# Patient Record
Sex: FEMALE | Race: ASIAN | NOT HISPANIC OR LATINO | URBAN - METROPOLITAN AREA
[De-identification: names, ages, dates, MRNs, and addresses within clinical notes are randomized per-mention and may not be internally consistent; named-entity substitution may affect disease eponyms.]

---

## 2018-02-19 ENCOUNTER — INPATIENT (INPATIENT)
Facility: HOSPITAL | Age: 63
LOS: 4 days | Discharge: HOME | End: 2018-02-24
Attending: NEUROLOGICAL SURGERY | Admitting: INTERNAL MEDICINE
Payer: MEDICAID

## 2018-02-19 VITALS
SYSTOLIC BLOOD PRESSURE: 132 MMHG | DIASTOLIC BLOOD PRESSURE: 74 MMHG | OXYGEN SATURATION: 100 % | HEART RATE: 75 BPM | TEMPERATURE: 98 F | RESPIRATION RATE: 16 BRPM

## 2018-02-19 DIAGNOSIS — G91.9 HYDROCEPHALUS, UNSPECIFIED: ICD-10-CM

## 2018-02-19 DIAGNOSIS — R26.2 DIFFICULTY IN WALKING, NOT ELSEWHERE CLASSIFIED: ICD-10-CM

## 2018-02-19 DIAGNOSIS — Z29.9 ENCOUNTER FOR PROPHYLACTIC MEASURES, UNSPECIFIED: ICD-10-CM

## 2018-02-19 DIAGNOSIS — R74.0 NONSPECIFIC ELEVATION OF LEVELS OF TRANSAMINASE AND LACTIC ACID DEHYDROGENASE [LDH]: ICD-10-CM

## 2018-02-19 DIAGNOSIS — R10.13 EPIGASTRIC PAIN: ICD-10-CM

## 2018-02-19 LAB
ALBUMIN SERPL ELPH-MCNC: 4.2 G/DL — SIGNIFICANT CHANGE UP (ref 3.3–5)
ALP SERPL-CCNC: 78 U/L — SIGNIFICANT CHANGE UP (ref 40–120)
ALT FLD-CCNC: 35 U/L — HIGH (ref 4–33)
APPEARANCE UR: CLEAR — SIGNIFICANT CHANGE UP
APTT BLD: 35.4 SEC — SIGNIFICANT CHANGE UP (ref 27.5–37.4)
AST SERPL-CCNC: 35 U/L — HIGH (ref 4–32)
BASOPHILS # BLD AUTO: 0.02 K/UL — SIGNIFICANT CHANGE UP (ref 0–0.2)
BASOPHILS NFR BLD AUTO: 0.2 % — SIGNIFICANT CHANGE UP (ref 0–2)
BILIRUB SERPL-MCNC: 0.2 MG/DL — SIGNIFICANT CHANGE UP (ref 0.2–1.2)
BILIRUB UR-MCNC: NEGATIVE — SIGNIFICANT CHANGE UP
BLOOD UR QL VISUAL: NEGATIVE — SIGNIFICANT CHANGE UP
BUN SERPL-MCNC: 14 MG/DL — SIGNIFICANT CHANGE UP (ref 7–23)
CALCIUM SERPL-MCNC: 9.3 MG/DL — SIGNIFICANT CHANGE UP (ref 8.4–10.5)
CHLORIDE SERPL-SCNC: 97 MMOL/L — LOW (ref 98–107)
CK MB BLD-MCNC: 1.98 NG/ML — SIGNIFICANT CHANGE UP (ref 1–4.7)
CK MB BLD-MCNC: SIGNIFICANT CHANGE UP (ref 0–2.5)
CK SERPL-CCNC: 117 U/L — SIGNIFICANT CHANGE UP (ref 25–170)
CO2 SERPL-SCNC: 23 MMOL/L — SIGNIFICANT CHANGE UP (ref 22–31)
COLOR SPEC: SIGNIFICANT CHANGE UP
CREAT SERPL-MCNC: 0.69 MG/DL — SIGNIFICANT CHANGE UP (ref 0.5–1.3)
EOSINOPHIL # BLD AUTO: 0.09 K/UL — SIGNIFICANT CHANGE UP (ref 0–0.5)
EOSINOPHIL NFR BLD AUTO: 1.1 % — SIGNIFICANT CHANGE UP (ref 0–6)
GLUCOSE SERPL-MCNC: 117 MG/DL — HIGH (ref 70–99)
GLUCOSE UR-MCNC: NEGATIVE — SIGNIFICANT CHANGE UP
HCT VFR BLD CALC: 41.1 % — SIGNIFICANT CHANGE UP (ref 34.5–45)
HGB BLD-MCNC: 13.3 G/DL — SIGNIFICANT CHANGE UP (ref 11.5–15.5)
IMM GRANULOCYTES # BLD AUTO: 0.01 # — SIGNIFICANT CHANGE UP
IMM GRANULOCYTES NFR BLD AUTO: 0.1 % — SIGNIFICANT CHANGE UP (ref 0–1.5)
INR BLD: 0.97 — SIGNIFICANT CHANGE UP (ref 0.88–1.17)
KETONES UR-MCNC: NEGATIVE — SIGNIFICANT CHANGE UP
LEUKOCYTE ESTERASE UR-ACNC: NEGATIVE — SIGNIFICANT CHANGE UP
LIDOCAIN IGE QN: 69.7 U/L — HIGH (ref 7–60)
LYMPHOCYTES # BLD AUTO: 2.83 K/UL — SIGNIFICANT CHANGE UP (ref 1–3.3)
LYMPHOCYTES # BLD AUTO: 34.4 % — SIGNIFICANT CHANGE UP (ref 13–44)
MCHC RBC-ENTMCNC: 28.4 PG — SIGNIFICANT CHANGE UP (ref 27–34)
MCHC RBC-ENTMCNC: 32.4 % — SIGNIFICANT CHANGE UP (ref 32–36)
MCV RBC AUTO: 87.8 FL — SIGNIFICANT CHANGE UP (ref 80–100)
MONOCYTES # BLD AUTO: 0.52 K/UL — SIGNIFICANT CHANGE UP (ref 0–0.9)
MONOCYTES NFR BLD AUTO: 6.3 % — SIGNIFICANT CHANGE UP (ref 2–14)
MUCOUS THREADS # UR AUTO: SIGNIFICANT CHANGE UP
NEUTROPHILS # BLD AUTO: 4.76 K/UL — SIGNIFICANT CHANGE UP (ref 1.8–7.4)
NEUTROPHILS NFR BLD AUTO: 57.9 % — SIGNIFICANT CHANGE UP (ref 43–77)
NITRITE UR-MCNC: NEGATIVE — SIGNIFICANT CHANGE UP
NRBC # FLD: 0 — SIGNIFICANT CHANGE UP
PH UR: 6.5 — SIGNIFICANT CHANGE UP (ref 4.6–8)
PLATELET # BLD AUTO: 210 K/UL — SIGNIFICANT CHANGE UP (ref 150–400)
PMV BLD: 11.6 FL — SIGNIFICANT CHANGE UP (ref 7–13)
POTASSIUM SERPL-MCNC: 4.6 MMOL/L — SIGNIFICANT CHANGE UP (ref 3.5–5.3)
POTASSIUM SERPL-SCNC: 4.6 MMOL/L — SIGNIFICANT CHANGE UP (ref 3.5–5.3)
PROT SERPL-MCNC: 7.4 G/DL — SIGNIFICANT CHANGE UP (ref 6–8.3)
PROT UR-MCNC: NEGATIVE MG/DL — SIGNIFICANT CHANGE UP
PROTHROM AB SERPL-ACNC: 11.1 SEC — SIGNIFICANT CHANGE UP (ref 9.8–13.1)
RBC # BLD: 4.68 M/UL — SIGNIFICANT CHANGE UP (ref 3.8–5.2)
RBC # FLD: 12.5 % — SIGNIFICANT CHANGE UP (ref 10.3–14.5)
RBC CASTS # UR COMP ASSIST: SIGNIFICANT CHANGE UP (ref 0–?)
SODIUM SERPL-SCNC: 135 MMOL/L — SIGNIFICANT CHANGE UP (ref 135–145)
SP GR SPEC: 1.01 — SIGNIFICANT CHANGE UP (ref 1–1.04)
SQUAMOUS # UR AUTO: SIGNIFICANT CHANGE UP
TROPONIN T SERPL-MCNC: < 0.06 NG/ML — SIGNIFICANT CHANGE UP (ref 0–0.06)
UROBILINOGEN FLD QL: NORMAL MG/DL — SIGNIFICANT CHANGE UP
WBC # BLD: 8.23 K/UL — SIGNIFICANT CHANGE UP (ref 3.8–10.5)
WBC # FLD AUTO: 8.23 K/UL — SIGNIFICANT CHANGE UP (ref 3.8–10.5)

## 2018-02-19 PROCEDURE — 70450 CT HEAD/BRAIN W/O DYE: CPT | Mod: 26

## 2018-02-19 PROCEDURE — 99233 SBSQ HOSP IP/OBS HIGH 50: CPT | Mod: GC

## 2018-02-19 PROCEDURE — 70553 MRI BRAIN STEM W/O & W/DYE: CPT | Mod: 26

## 2018-02-19 PROCEDURE — 71046 X-RAY EXAM CHEST 2 VIEWS: CPT | Mod: 26

## 2018-02-19 RX ORDER — ENOXAPARIN SODIUM 100 MG/ML
40 INJECTION SUBCUTANEOUS EVERY 24 HOURS
Qty: 0 | Refills: 0 | Status: DISCONTINUED | OUTPATIENT
Start: 2018-02-19 | End: 2018-02-19

## 2018-02-19 RX ORDER — LIDOCAINE 4 G/100G
10 CREAM TOPICAL ONCE
Qty: 0 | Refills: 0 | Status: COMPLETED | OUTPATIENT
Start: 2018-02-19 | End: 2018-02-19

## 2018-02-19 RX ORDER — SODIUM CHLORIDE 9 MG/ML
1000 INJECTION INTRAMUSCULAR; INTRAVENOUS; SUBCUTANEOUS ONCE
Qty: 0 | Refills: 0 | Status: COMPLETED | OUTPATIENT
Start: 2018-02-19 | End: 2018-02-19

## 2018-02-19 RX ORDER — FAMOTIDINE 10 MG/ML
20 INJECTION INTRAVENOUS ONCE
Qty: 0 | Refills: 0 | Status: COMPLETED | OUTPATIENT
Start: 2018-02-19 | End: 2018-02-19

## 2018-02-19 RX ADMIN — FAMOTIDINE 20 MILLIGRAM(S): 10 INJECTION INTRAVENOUS at 08:31

## 2018-02-19 RX ADMIN — LIDOCAINE 10 MILLILITER(S): 4 CREAM TOPICAL at 08:31

## 2018-02-19 RX ADMIN — SODIUM CHLORIDE 1000 MILLILITER(S): 9 INJECTION INTRAMUSCULAR; INTRAVENOUS; SUBCUTANEOUS at 08:31

## 2018-02-19 RX ADMIN — Medication 30 MILLILITER(S): at 08:31

## 2018-02-19 NOTE — ED ADULT TRIAGE NOTE - CHIEF COMPLAINT QUOTE
Pt minimal English Yolanda speaking. pt st" I have pain here." localizing pain to epigastric area. As per Niece " She has this pain on & off for two weeks now and is walking very slow takes baby steps...she is very weak. She was just dx with fluid on kidney and needs to get a cat scan." as per Interpretation "There is a pain near stomach and heart , I feel very weak can't seem to walk just taking child like steps....sometimes I feel short of breath. " Pt minimal English Yolanda speaking. pt st" I have pain here." localizing pain to epigastric area. As per Niece " She has this pain on & off for two weeks now she is walking very slow takes baby steps...she is very weak. She was just dx with fluid on kidney and needs to get a cat scan."   As per [place] Interpretation  Pt stating "There is a pain near heart and stomach..., I feel very weak can't seem to walk just taking child like steps....sometimes I feel short of breath. "

## 2018-02-19 NOTE — ED PROVIDER NOTE - MEDICAL DECISION MAKING DETAILS
63 y/o F presents with c/o weakness, n/t with walking, epigastric pain non radiating, not relieved or worsen with food intake or movement. r/o ACS vs GERD, cbc, cmp, froylan, ekg, cxr, pepcid, maalox, lidocaine viscous, iv ns, reassess.  possible dc with neurology f/u

## 2018-02-19 NOTE — CONSULT NOTE ADULT - ASSESSMENT
62F p/w gait instability, memory loss found to have hydrocephalus w/ transependymal flow. no HAs, neurologically intact w/ wide-based gait.    - MRI non contrast CSF flow w/ CISS sequence  - neurology f/u 62F p/w gait instability, memory loss found to have hydrocephalus w/ transependymal flow. no HAs, neurologically intact w/ wide-based gait.    - MRI non contrast CSF flow w/ CISS sequence  - MRI non contrast C/T/L-spine  - neurology f/u

## 2018-02-19 NOTE — H&P ADULT - NSHPSOCIALHISTORY_GEN_ALL_CORE
no tobacco, alcohol or drug use.  w/ children. born in [facility] now lives in [place]

## 2018-02-19 NOTE — ED ADULT NURSE NOTE - OBJECTIVE STATEMENT
Cally RN : Pt. A&Ox4 , primarily [place] speaking translated by christ by bedside c/o epigastric pain since earlier this morning . Pt. denies any N/V , abd pain. Pt. states for the past 3 x weeks she has noticed increase weakness and numbness on hands and feet.  pt. [id number] weeks she was told she has a cyst on her kidney , was not able to give further info. Pt. Vitals as noted, and in no acute distress. Pt. placed on cardiac monitor , NS. IV placed on right hand labs drawn and sent . Rpt given to primary RN .

## 2018-02-19 NOTE — H&P ADULT - NSHPREVIEWOFSYSTEMS_GEN_ALL_CORE
CONSTITUTIONAL: No fever, no chills  EYES: No eye pain, no visual disturbance  Mouth: no pain in mouth, no cuts  RESPIRATORY: No cough, No sob  CARDIOVASCULAR: No CP, no palpitations  GASTROINTESTINAL: epigastric pain+, no n/v/d  GENITOURINARY: No dysuria, no hematuria  NEUROLOGICAL: No headaches, difficulty walking+  SKIN: No itching, no rashes  MUSCULOSKELETAL: No joint pain, no joint swelling

## 2018-02-19 NOTE — H&P ADULT - NSHPPHYSICALEXAM_GEN_ALL_CORE
Vital Signs Last 24 Hrs  T(C): 36.4 (19 Feb 2018 07:12), Max: 36.4 (19 Feb 2018 07:12)  T(F): 97.5 (19 Feb 2018 07:12), Max: 97.5 (19 Feb 2018 07:12)  HR: 72 (19 Feb 2018 08:53) (72 - 78)  BP: 133/88 (19 Feb 2018 08:53) (132/74 - 146/91)  BP(mean): --  RR: 15 (19 Feb 2018 08:53) (15 - 16)  SpO2: 100% (19 Feb 2018 08:53) (98% - 100%)    GENERAL: NAD, well-developed  HEAD:  Atraumatic, Normocephalic  EYES: EOMI, PERRLA, conjunctiva and sclera clear  Mouth: MMM, no lesions  NECK: Supple, no appreciable masses  Lung: normal work of breathing, cta b/l  Chest: S1&S2+, rrr, no m/r/g appreciated  ABDOMEN: bs+, soft, mild epigastric tenderness, nd, no appreciable masses  : No peterson catheter, no CVA tenderness  EXTREMITIES:  radial pulse present b/l, PT present b/l, no pitting edema present  Neuro: A&Ox3 (took extra time to give month and year), CN II-XII intact, motor in upper and lower extremities in flexor and extensors 5/5, finger to nose b/l intact, romberg positive, no pronator drift, broad based-shuffling when walking, truncal ataxia when walking tandem walking  SKIN: warm and dry, no visible rashes

## 2018-02-19 NOTE — H&P ADULT - PROBLEM SELECTOR PLAN 1
Patient w/ difficulty walking x3d w/ obstructive hydrocephalus on CT  - Neurology consult appreciated. MRI Brain ordered  - Per ED neurosurgery has been consulted  - fall precautions

## 2018-02-19 NOTE — H&P ADULT - PROBLEM SELECTOR PLAN 4
SCD for dvt ppx given possible need for LP repeat LFT's. if elevated will need to check Hep panel and RUQ US

## 2018-02-19 NOTE — H&P ADULT - ATTENDING COMMENTS
Patient seen and examined, plan of care d/w resident.    63 yo F with h/o EDA infection presenting w/ gait instability and memory issues CTH here with new hydrocephalus when compared to 2010, seen by neurology rec MRI to r/o obstructive lesion.    #Hydrocephalus:  NPH vs obstructive vs other  -f/u MRI  -f/u neurology---defer to them whether pt needs further IP w/u if MRI negative  -NSY c/s per neuro  -fall precautions  -PT eval

## 2018-02-19 NOTE — H&P ADULT - NSHPLABSRESULTS_GEN_ALL_CORE
Personally reviewed available labs, imaging and ekg     Labs  CBC Full  -  ( 2018 07:50 )  WBC Count : 8.23 K/uL  Hemoglobin : 13.3 g/dL  Hematocrit : 41.1 %  Platelet Count - Automated : 210 K/uL  Mean Cell Volume : 87.8 fL  Mean Cell Hemoglobin : 28.4 pg  Mean Cell Hemoglobin Concentration : 32.4 %  Auto Neutrophil # : 4.76 K/uL  Auto Lymphocyte # : 2.83 K/uL  Auto Monocyte # : 0.52 K/uL  Auto Eosinophil # : 0.09 K/uL  Auto Basophil # : 0.02 K/uL  Auto Neutrophil % : 57.9 %  Auto Lymphocyte % : 34.4 %  Auto Monocyte % : 6.3 %  Auto Eosinophil % : 1.1 %  Auto Basophil % : 0.2 %        135  |  97<L>  |  14  ----------------------------<  117<H>  4.6   |  23  |  0.69    Ca    9.3      2018 07:50    TPro  7.4  /  Alb  4.2  /  TBili  0.2  /  DBili  x   /  AST  35<H>  /  ALT  35<H>  /  AlkPhos  78      PT/INR - ( 2018 07:50 )   PT: 11.1 SEC;   INR: 0.97          PTT - ( 2018 07:50 )  PTT:35.4 SEC  Urinalysis Basic - ( 2018 09:41 )    Color: COLORL / Appearance: CLEAR / S.010 / pH: 6.5  Gluc: NEGATIVE / Ketone: NEGATIVE  / Bili: NEGATIVE / Urobili: NORMAL mg/dL   Blood: NEGATIVE / Protein: NEGATIVE mg/dL / Nitrite: NEGATIVE   Leuk Esterase: NEGATIVE / RBC: 0-2 / WBC x   Sq Epi: OCC / Non Sq Epi: x / Bacteria: x      CAPILLARY BLOOD GLUCOSE        CARDIAC MARKERS ( 2018 07:50 )  x     / < 0.06 ng/mL / 117 u/L / 1.98 ng/mL / x        Imaging  < from: CT Head No Cont (18 @ 09:28) >    IMPRESSION:     New hydrocephalus likely at the junction of the aqueduct and the fourth   ventricle, since 2010.    < end of copied text >        EKG: NSR 86bpm w/o SABRA appreciated Personally reviewed available labs, imaging and ekg     Labs  CBC Full  -  ( 2018 07:50 )  WBC Count : 8.23 K/uL  Hemoglobin : 13.3 g/dL  Hematocrit : 41.1 %  Platelet Count - Automated : 210 K/uL  Mean Cell Volume : 87.8 fL  Mean Cell Hemoglobin : 28.4 pg  Mean Cell Hemoglobin Concentration : 32.4 %  Auto Neutrophil # : 4.76 K/uL  Auto Lymphocyte # : 2.83 K/uL  Auto Monocyte # : 0.52 K/uL  Auto Eosinophil # : 0.09 K/uL  Auto Basophil # : 0.02 K/uL  Auto Neutrophil % : 57.9 %  Auto Lymphocyte % : 34.4 %  Auto Monocyte % : 6.3 %  Auto Eosinophil % : 1.1 %  Auto Basophil % : 0.2 %        135  |  97<L>  |  14  ----------------------------<  117<H>  4.6   |  23  |  0.69    Ca    9.3      2018 07:50    TPro  7.4  /  Alb  4.2  /  TBili  0.2  /  DBili  x   /  AST  35<H>  /  ALT  35<H>  /  AlkPhos  78      PT/INR - ( 2018 07:50 )   PT: 11.1 SEC;   INR: 0.97          PTT - ( 2018 07:50 )  PTT:35.4 SEC  Urinalysis Basic - ( 2018 09:41 )    Color: COLORL / Appearance: CLEAR / S.010 / pH: 6.5  Gluc: NEGATIVE / Ketone: NEGATIVE  / Bili: NEGATIVE / Urobili: NORMAL mg/dL   Blood: NEGATIVE / Protein: NEGATIVE mg/dL / Nitrite: NEGATIVE   Leuk Esterase: NEGATIVE / RBC: 0-2 / WBC x   Sq Epi: OCC / Non Sq Epi: x / Bacteria: x      CAPILLARY BLOOD GLUCOSE        CARDIAC MARKERS ( 2018 07:50 )  x     / < 0.06 ng/mL / 117 u/L / 1.98 ng/mL / x        Imaging  < from: CT Head No Cont (18 @ 09:28) >    IMPRESSION:   New hydrocephalus likely at the junction of the aqueduct and the fourth   ventricle, since 2010.  < end of copied text >    CXR AP 18: Clear lungs    EKG: NSR 86bpm w/o SABRA appreciated

## 2018-02-19 NOTE — H&P ADULT - PROBLEM SELECTOR PLAN 2
Acute Hydrocephalus w/ obstruction at the transition from aqueduct to 4th ventricle  - identified on CT head and w/o evidence of intracranial hemorrhage, extraaxial collection, vasogenic edema, mass effect, midline shift, or central herniation.  - pending MRI per Neuro and NeuroSx consult  - fall precautions

## 2018-02-19 NOTE — ED ADULT NURSE REASSESSMENT NOTE - NS ED NURSE REASSESS COMMENT FT1
pt started by night float RN, appears in NAD @ this time, mes as per orders, awaiting reassessment, results, and further orders, family @ bedside, will continue to monitor.

## 2018-02-19 NOTE — CONSULT NOTE ADULT - SUBJECTIVE AND OBJECTIVE BOX
Neurology Consult Note      Pt is a 61 y/o F pt with no sig pmhx presents with difficulty walking for 3 days.  Pt states she has had difficulty walking, generalized weakness and memory problems for 3 - 4 days. She has never had similar complaints in the past and cannot recall any inciting events that could be attributed to current symptoms. No trauma, no back pain, no recent falls. She has a history of uncontrollable bladder which has been going on for months. Also, family at the bedsdie reports a change in mentation noticed when cooking a meal. For the past several days has not put appropriate ingredients as she normally would. Pt also confirms change in mentation and difficulty with short term memory.    Social - returned from a trip to [facility] 3 weeks ago. Denies HA, blurry vision, cp, sob, n/v/d, back pain.    [facility] [phone]    Vital Signs Last 24 Hrs  T(C): 36.4 (2018 07:12), Max: 36.4 (2018 07:12)  T(F): 97.5 (2018 07:12), Max: 97.5 (2018 07:12)  HR: 72 (2018 08:53) (72 - 78)  BP: 133/88 (2018 08:53) (132/74 - 146/91)  BP(mean): --  RR: 15 (2018 08:53) (15 - 16)  SpO2: 100% (2018 08:53) (98% - 100%)    Neuro Exam  MS - AAOx3, difficulty remembering the current month and year, 3 out of 3 word recal in immediate short term, in intermediate interval recalls one words. follows 2 step commands, crosses midline when prompted.   CNS - EOMI, b/l few beat horizontal nystagmus, face symmetric, no sensory changes.  Motor - 5/5 throughout  Sensory - light touch in tact throughout  Coordination - FNF in tact b/l  Gait - can walk unassisted narrow steppage, difficulty tandem walking, rom cage positive.   Reflexes - Hyperflexive throughout - triceps, biceps, brachiorad, patellar, ankles    Labs                        13.3   8.23  )-----------( 210      ( 2018 07:50 )             41.1   02-    135  |  97<L>  |  14  ----------------------------<  117<H>  4.6   |  23  |  0.69    Ca    9.3      2018 07:50    TPro  7.4  /  Alb  4.2  /  TBili  0.2  /  DBili  x   /  AST  35<H>  /  ALT  35<H>  /  AlkPhos  78      PT/INR - ( 2018 07:50 )   PT: 11.1 SEC;   INR: 0.97          PTT - ( 2018 07:50 )  PTT:35.4 SEC    LIVER FUNCTIONS - ( 2018 07:50 )  Alb: 4.2 g/dL / Pro: 7.4 g/dL / ALK PHOS: 78 u/L / ALT: 35 u/L / AST: 35 u/L / GGT: x           Urinalysis Basic - ( 2018 09:41 )    Color: COLORL / Appearance: CLEAR / S.010 / pH: 6.5  Gluc: NEGATIVE / Ketone: NEGATIVE  / Bili: NEGATIVE / Urobili: NORMAL mg/dL   Blood: NEGATIVE / Protein: NEGATIVE mg/dL / Nitrite: NEGATIVE   Leuk Esterase: NEGATIVE / RBC: 0-2 / WBC x   Sq Epi: OCC / Non Sq Epi: x / Bacteria: x    Imaging  CT head - New hydrocephalus likely at the junction of the aqueduct and the fourth   ventricle, since 2010. Neurology Consult Note      Pt is a 61 y/o F pt with no sig pmhx presents with difficulty walking for 3 days.  Pt states she has had difficulty walking, generalized weakness and memory problems for 3 - 4 days. She has never had similar complaints in the past and cannot recall any inciting events that could be attributed to current symptoms. No trauma, no back pain, no recent falls. She has a history of uncontrollable bladder which has been going on for months. Also, family at the bedsdie reports a change in mentation noticed when cooking a meal. For the past several days has not put appropriate ingredients as she normally would. Pt also confirms change in mentation and difficulty with short term memory.    Social - returned from a trip to [facility] 3 weeks ago. Denies HA, blurry vision, cp, sob, n/v/d, back pain.    [facility] [phone]    Vital Signs Last 24 Hrs  T(C): 36.4 (2018 07:12), Max: 36.4 (2018 07:12)  T(F): 97.5 (2018 07:12), Max: 97.5 (2018 07:12)  HR: 72 (2018 08:53) (72 - 78)  BP: 133/88 (2018 08:53) (132/74 - 146/91)  BP(mean): --  RR: 15 (2018 08:53) (15 - 16)  SpO2: 100% (2018 08:53) (98% - 100%)    Neuro Exam  MS - AAOx3, difficulty remembering the current month and year, 3 out of 3 word recal in immediate short term, in intermediate interval recalls one words. follows 2 step commands, crosses midline when prompted.   CNS - EOMI, b/l few beat horizontal nystagmus, face symmetric, no sensory changes.  Motor - 5/5 throughout  Sensory - light touch in tact throughout  Coordination - FNF in tact b/l  Gait - can walk unassisted, wide based gait, difficulty tandem walking, romberg positive.   Reflexes - Hyperflexive throughout - triceps, biceps, brachiorad, patellar, ankles    Labs                        13.3   8.23  )-----------( 210      ( 2018 07:50 )             41.1   02-    135  |  97<L>  |  14  ----------------------------<  117<H>  4.6   |  23  |  0.69    Ca    9.3      2018 07:50    TPro  7.4  /  Alb  4.2  /  TBili  0.2  /  DBili  x   /  AST  35<H>  /  ALT  35<H>  /  AlkPhos  78      PT/INR - ( 2018 07:50 )   PT: 11.1 SEC;   INR: 0.97          PTT - ( 2018 07:50 )  PTT:35.4 SEC    LIVER FUNCTIONS - ( 2018 07:50 )  Alb: 4.2 g/dL / Pro: 7.4 g/dL / ALK PHOS: 78 u/L / ALT: 35 u/L / AST: 35 u/L / GGT: x           Urinalysis Basic - ( 2018 09:41 )    Color: COLORL / Appearance: CLEAR / S.010 / pH: 6.5  Gluc: NEGATIVE / Ketone: NEGATIVE  / Bili: NEGATIVE / Urobili: NORMAL mg/dL   Blood: NEGATIVE / Protein: NEGATIVE mg/dL / Nitrite: NEGATIVE   Leuk Esterase: NEGATIVE / RBC: 0-2 / WBC x   Sq Epi: OCC / Non Sq Epi: x / Bacteria: x    Imaging  CT head - New hydrocephalus likely at the junction of the aqueduct and the fourth   ventricle, since 2010.

## 2018-02-19 NOTE — ED PROVIDER NOTE - ATTENDING CONTRIBUTION TO CARE
Dr. Banegas:  I performed a face to face bedside interview with patient regarding history of present illness, review of symptoms and past medical history. I completed an independent physical exam.  I have discussed patient's plan of care with PA.   I agree with note as stated above, having amended the EMR as needed to reflect my findings.   This includes HISTORY OF PRESENT ILLNESS, HIV, PAST MEDICAL/SURGICAL/FAMILY/SOCIAL HISTORY, ALLERGIES AND HOME MEDICATIONS, REVIEW OF SYSTEMS, PHYSICAL EXAM, and any PROGRESS NOTES during the time I functioned as the attending physician for this patient.    62F denies pmh presents with weakness and numbness/tingling in extremities x 2-3 weeks and intermittent epigastric pain x 3 days.  Came from [place] 3 weeks ago.  +Subjective fevers upon initial arrival but not recently.  Denies HA, cp, sob, n/v/d, back pain, urinary symptoms.      Exam:  - nad  - rrr  - ctab  - abd soft, +discomfort to palpation epigastric area  - no focal neuro deficits, strength 5/5 bilateral extremities, sensation to light touch equal bilateral extremities, normal gait (although pt states she is taking "baby steps" compared to usual)    A/P  - ddx: gerd/gastritis, gallstones, r/o ACS  - cbc, cmp, lipase, vbg, trop, ua  - ekg  - cxr  - US RUQ  - pepcid, maalox Dr. Banegas:  I performed a face to face bedside interview with patient regarding history of present illness, review of symptoms and past medical history. I completed an independent physical exam.  I have discussed patient's plan of care with PA.   I agree with note as stated above, having amended the EMR as needed to reflect my findings.   This includes HISTORY OF PRESENT ILLNESS, HIV, PAST MEDICAL/SURGICAL/FAMILY/SOCIAL HISTORY, ALLERGIES AND HOME MEDICATIONS, REVIEW OF SYSTEMS, PHYSICAL EXAM, and any PROGRESS NOTES during the time I functioned as the attending physician for this patient.    62F denies pmh presents with weakness and numbness/tingling in extremities x 2-3 weeks and intermittent epigastric pain x 3 days.  Over past two weeks, daughter noticed that patient is taking smaller "baby" steps, which is different than her usual gait.  Pt attributes it to feeling weak.  Came from [place] 3 weeks ago.  +Subjective fevers 3 weeks ago but has resolved.  Denies HA, cp, sob, n/v/d, back pain, urinary symptoms.      Exam:  - nad  - rrr  - ctab  - abd soft, +mild discomfort to palpation epigastric area  - no focal neuro deficits, strength 5/5 bilateral extremities, sensation to light touch equal bilateral extremities, negative Romberg, rectal tone intact, slightly shuffling gait but non-ataxic, reflexes intact upper & lower extremities (?mildly hyper-reflexive lower extremity)    A/P  - ddx epigastric pain: gerd/gastritis, gallstones, r/o ACS;   - shuffling gait, unclear etiology, no focal neuro deficits otherwise; consider Guillain-Barré but 5/5 strength in lower extremity on exam, consider early onset Parkinson's; f/u neurology  - cbc, cmp, lipase, vbg, trop, ua  - ekg  - cxr  - pepcid, maalox Dr. Banegas:  I performed a face to face bedside interview with patient regarding history of present illness, review of symptoms and past medical history. I completed an independent physical exam.  I have discussed patient's plan of care with PA.   I agree with note as stated above, having amended the EMR as needed to reflect my findings.   This includes HISTORY OF PRESENT ILLNESS, HIV, PAST MEDICAL/SURGICAL/FAMILY/SOCIAL HISTORY, ALLERGIES AND HOME MEDICATIONS, REVIEW OF SYSTEMS, PHYSICAL EXAM, and any PROGRESS NOTES during the time I functioned as the attending physician for this patient.    62F denies pmh presents with weakness and numbness/tingling in extremities x 2-3 weeks and intermittent epigastric pain x 3 days.  Over past two weeks, daughter noticed that patient is taking smaller "baby" steps, which is different than her usual gait.  Pt attributes it to feeling weak.  Came from [place] 3 weeks ago.  +Subjective fevers 3 weeks ago but has resolved.  Denies HA, cp, sob, n/v/d, back pain.  Also states she seems to be having trouble holding her urine.    Exam:  - nad  - rrr  - ctab  - abd soft, +mild discomfort to palpation epigastric area  - no focal neuro deficits, strength 5/5 bilateral extremities, sensation to light touch equal bilateral extremities, negative Romberg, rectal tone intact, slightly shuffling gait but non-ataxic, reflexes intact upper & lower extremities (?mildly hyper-reflexive lower extremity)    A/P  - ddx epigastric pain: gerd/gastritis, gallstones, r/o ACS;   - shuffling gait, unclear etiology but concern for neuro pathology  - cbc, cmp, lipase, vbg, trop, ua  - ekg  - cxr  - CT head  - pepcid, maalox  - neuro consult

## 2018-02-19 NOTE — CONSULT NOTE ADULT - SUBJECTIVE AND OBJECTIVE BOX
HPI: 62yof w/ no significant PMHx p/w gait instability, several episodes of urinary incontinence, and memory loss since 7 days ago. Her symptoms began suddenly and did not resolve. She presented to the ER today because of the gait instability. CTH shows marked dilation of the B/L ventricles and third ventricle. MRI shows no mass lesion w/ transependymal flow.    PMHx: none  PSHx: none  Medications: aluminum hydroxide/magnesium hydroxide/simethicone Suspension PRN    Allergies: nkda  Social history: none  Family History: none    VS: T(C): 36.6 (02-19-18 @ 16:22)  HR: 101 (02-19-18 @ 16:22)  BP: 123/80 (02-19-18 @ 16:22)  RR: 18 (02-19-18 @ 16:22)  SpO2: 100% (02-19-18 @ 16:22)  Wt(kg): --                          13.3   8.23  )-----------( 210      ( 19 Feb 2018 07:50 )             41.1     02-19    135  |  97<L>  |  14  ----------------------------<  117<H>  4.6   |  23  |  0.69    Ca    9.3      19 Feb 2018 07:50    TPro  7.4  /  Alb  4.2  /  TBili  0.2  /  DBili  x   /  AST  35<H>  /  ALT  35<H>  /  AlkPhos  78  02-19    PT/INR - ( 19 Feb 2018 07:50 )   PT: 11.1 SEC;   INR: 0.97          PTT - ( 19 Feb 2018 07:50 )  PTT:35.4 SEC    Exam:  AAOx3  PERRL, EOMI, face symmetric, no tongue deviation  5/5 throughout, no pronator drift  Sensation intact to light touch throughout  Reflexes 2+ throughout  No dysmetria  slow wide based gait

## 2018-02-19 NOTE — CONSULT NOTE ADULT - ASSESSMENT
Pt is a 63 y/o F pt with no sig pmhx presents with difficulty walking for 3 days.  Pt states she has had difficulty walking, generalized weakness and memory problems for 3 - 4 days. Neuro exam is remarkable for hyperflexia throughout, narrow based shuffling gait, and short term memory deficit. CT head shows new hydrocephalus likely at the junction of the aqueduct and the fourth ventricle.    DDx - Abnormal gait and mentation 2/2 acute hydrocephalus seen on CT head    Reccs:  Either admission vs CDU   Neurosurgery eval  MRI w/wo to assess for any obstruction/mass lesion Pt is a 63 y/o F pt with no sig pmhx presents with difficulty walking for 3 days.  Pt states she has had difficulty walking, generalized weakness and memory problems for 3 - 4 days. Neuro exam is remarkable for hyperflexia throughout, wide based gait, and short term memory deficit. CT head shows new hydrocephalus likely at the junction of the aqueduct and the fourth ventricle.    DDx - Abnormal gait and memory problems 2/2 acute hydrocephalus seen on CT head    Reccs:  Either admission vs CDU   Neurosurgery eval  MRI w/wo to assess for any obstruction/mass lesion

## 2018-02-19 NOTE — ED PROVIDER NOTE - OBJECTIVE STATEMENT
63 y/o F pt with no sig pmhx presents with weakness and numbness/tingling in extremities x 2-3 weeks and intermittent epigastric pain x 3 days.  Pt states she feels weakness, numbness /tingling when she is walking, her epigastric pain does not get relieved or worse with activity or food intake. Pt denies taking any antiacids. Pt came from [place] 3 weeks ago.  +Subjective fevers upon initial arrival but not recently.  Denies HA, blurry vision, cp, sob, n/v/d, back pain, urinary symptoms. 61 y/o F pt with no sig pmhx presents with weakness and numbness/tingling in extremities x 2-3 weeks and intermittent epigastric pain x 3 days.  Pt states she feels weakness, numbness /tingling when she is walking, her epigastric pain does not get relieved or worse with activity or food intake. Pt denies taking any antiacids. Pt came from [place] 3 weeks ago.  +Subjective fevers upon initial arrival but not recently.  Denies HA, blurry vision, cp, sob, n/v/d, back pain, urinary symptoms.  Maximo santacruz's # [phone]

## 2018-02-19 NOTE — H&P ADULT - ASSESSMENT
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

## 2018-02-19 NOTE — H&P ADULT - PROBLEM SELECTOR PLAN 3
patient w/ intermittent epigastric pain for past few days. will clinically monitor, unclear if 2/2 acid reflux. w/o n/v/d. will provide trial of PPI w/ protonix 40mg PO daily

## 2018-02-19 NOTE — ED ADULT NURSE NOTE - CHIEF COMPLAINT QUOTE
Pt minimal English Yolanda speaking. pt st" I have pain here." localizing pain to epigastric area. As per Niece " She has this pain on & off for two weeks now she is walking very slow takes baby steps...she is very weak. She was just dx with fluid on kidney and needs to get a cat scan."   As per [facility] Interpretation  Pt stating "There is a pain near heart and stomach..., I feel very weak can't seem to walk just taking child like steps....sometimes I feel short of breath. "

## 2018-02-19 NOTE — H&P ADULT - HISTORY OF PRESENT ILLNESS
Patient deferred translation and discussed history in English    62F w/ hx of MAC lung infection s/p 6mo antibiotics presents to [facility] w/ complaint of 3d of difficulty walking. Patient reports that over the last 3d she has had to take small steps to walk to maintain balance. She has not had any trauma, fall, or back pain associated w/ inability to ambulate normally. Additionally she reports that her urinary frequency has increased over the last few days w/o painful urination or blood in urine as well has been forgetful over the last few weeks. She has not had any fevers (although reported to ED subjective fever 3wk prior), chills, headache, neck stiffness, chest pain, sob, cough, n/v/d, or new rashes on her body. She did report intermittent mid-abdominal pain hard to characterize. Additionally, she reports traveling to [place] 1mo prior. Patient deferred translation and discussed history in English as she also speak Yolanda    62F w/ hx of MAC lung infection s/p 6mo antibiotics presents to [facility] w/ complaint of 3d of difficulty walking. Patient reports that over the last 3d she has had to take small steps to walk to maintain balance. She has not had any trauma, fall, or back pain associated w/ inability to ambulate normally. Additionally she reports that her urinary frequency has increased over the last few days w/o painful urination or blood in urine as well has been forgetful over the last few weeks. She has not had any fevers (although reported to ED subjective fever 3wk prior), chills, headache, neck stiffness, chest pain, sob, cough, n/v/d, or new rashes on her body. She did report intermittent mid-abdominal pain hard to characterize. Additionally, she reports traveling to [place] 1mo prior.

## 2018-02-20 DIAGNOSIS — R07.9 CHEST PAIN, UNSPECIFIED: ICD-10-CM

## 2018-02-20 LAB
ALBUMIN SERPL ELPH-MCNC: 4.1 G/DL — SIGNIFICANT CHANGE UP (ref 3.3–5)
ALP SERPL-CCNC: 74 U/L — SIGNIFICANT CHANGE UP (ref 40–120)
ALT FLD-CCNC: 30 U/L — SIGNIFICANT CHANGE UP (ref 4–33)
APTT BLD: 35.5 SEC — SIGNIFICANT CHANGE UP (ref 27.5–37.4)
AST SERPL-CCNC: 25 U/L — SIGNIFICANT CHANGE UP (ref 4–32)
BASOPHILS # BLD AUTO: 0.02 K/UL — SIGNIFICANT CHANGE UP (ref 0–0.2)
BASOPHILS NFR BLD AUTO: 0.2 % — SIGNIFICANT CHANGE UP (ref 0–2)
BILIRUB SERPL-MCNC: 0.5 MG/DL — SIGNIFICANT CHANGE UP (ref 0.2–1.2)
BUN SERPL-MCNC: 12 MG/DL — SIGNIFICANT CHANGE UP (ref 7–23)
CALCIUM SERPL-MCNC: 9.2 MG/DL — SIGNIFICANT CHANGE UP (ref 8.4–10.5)
CHLORIDE SERPL-SCNC: 98 MMOL/L — SIGNIFICANT CHANGE UP (ref 98–107)
CO2 SERPL-SCNC: 25 MMOL/L — SIGNIFICANT CHANGE UP (ref 22–31)
CREAT SERPL-MCNC: 0.65 MG/DL — SIGNIFICANT CHANGE UP (ref 0.5–1.3)
EOSINOPHIL # BLD AUTO: 0.09 K/UL — SIGNIFICANT CHANGE UP (ref 0–0.5)
EOSINOPHIL NFR BLD AUTO: 1.1 % — SIGNIFICANT CHANGE UP (ref 0–6)
GLUCOSE SERPL-MCNC: 104 MG/DL — HIGH (ref 70–99)
HCG UR-SCNC: NEGATIVE — SIGNIFICANT CHANGE UP
HCT VFR BLD CALC: 40.3 % — SIGNIFICANT CHANGE UP (ref 34.5–45)
HCV AB S/CO SERPL IA: 0.14 S/CO — SIGNIFICANT CHANGE UP
HCV AB SERPL-IMP: SIGNIFICANT CHANGE UP
HGB BLD-MCNC: 13.5 G/DL — SIGNIFICANT CHANGE UP (ref 11.5–15.5)
HIV 1+2 AB+HIV1 P24 AG SERPL QL IA: SIGNIFICANT CHANGE UP
IMM GRANULOCYTES # BLD AUTO: 0.02 # — SIGNIFICANT CHANGE UP
IMM GRANULOCYTES NFR BLD AUTO: 0.2 % — SIGNIFICANT CHANGE UP (ref 0–1.5)
INR BLD: 1.05 — SIGNIFICANT CHANGE UP (ref 0.88–1.17)
LYMPHOCYTES # BLD AUTO: 3.41 K/UL — HIGH (ref 1–3.3)
LYMPHOCYTES # BLD AUTO: 42.6 % — SIGNIFICANT CHANGE UP (ref 13–44)
MAGNESIUM SERPL-MCNC: 1.9 MG/DL — SIGNIFICANT CHANGE UP (ref 1.6–2.6)
MCHC RBC-ENTMCNC: 28.9 PG — SIGNIFICANT CHANGE UP (ref 27–34)
MCHC RBC-ENTMCNC: 33.5 % — SIGNIFICANT CHANGE UP (ref 32–36)
MCV RBC AUTO: 86.3 FL — SIGNIFICANT CHANGE UP (ref 80–100)
MONOCYTES # BLD AUTO: 0.51 K/UL — SIGNIFICANT CHANGE UP (ref 0–0.9)
MONOCYTES NFR BLD AUTO: 6.4 % — SIGNIFICANT CHANGE UP (ref 2–14)
NEUTROPHILS # BLD AUTO: 3.96 K/UL — SIGNIFICANT CHANGE UP (ref 1.8–7.4)
NEUTROPHILS NFR BLD AUTO: 49.5 % — SIGNIFICANT CHANGE UP (ref 43–77)
NRBC # FLD: 0 — SIGNIFICANT CHANGE UP
PHOSPHATE SERPL-MCNC: 3.3 MG/DL — SIGNIFICANT CHANGE UP (ref 2.5–4.5)
PLATELET # BLD AUTO: 215 K/UL — SIGNIFICANT CHANGE UP (ref 150–400)
PMV BLD: 11.6 FL — SIGNIFICANT CHANGE UP (ref 7–13)
POTASSIUM SERPL-MCNC: 4.1 MMOL/L — SIGNIFICANT CHANGE UP (ref 3.5–5.3)
POTASSIUM SERPL-SCNC: 4.1 MMOL/L — SIGNIFICANT CHANGE UP (ref 3.5–5.3)
PROT SERPL-MCNC: 7.3 G/DL — SIGNIFICANT CHANGE UP (ref 6–8.3)
PROTHROM AB SERPL-ACNC: 11.7 SEC — SIGNIFICANT CHANGE UP (ref 9.8–13.1)
RBC # BLD: 4.67 M/UL — SIGNIFICANT CHANGE UP (ref 3.8–5.2)
RBC # FLD: 12.4 % — SIGNIFICANT CHANGE UP (ref 10.3–14.5)
SODIUM SERPL-SCNC: 136 MMOL/L — SIGNIFICANT CHANGE UP (ref 135–145)
SP GR UR: 1.01 — SIGNIFICANT CHANGE UP (ref 1–1.03)
SPECIMEN SOURCE: SIGNIFICANT CHANGE UP
WBC # BLD: 8.01 K/UL — SIGNIFICANT CHANGE UP (ref 3.8–10.5)
WBC # FLD AUTO: 8.01 K/UL — SIGNIFICANT CHANGE UP (ref 3.8–10.5)

## 2018-02-20 PROCEDURE — 70551 MRI BRAIN STEM W/O DYE: CPT | Mod: 26

## 2018-02-20 PROCEDURE — 72141 MRI NECK SPINE W/O DYE: CPT | Mod: 26

## 2018-02-20 PROCEDURE — 72146 MRI CHEST SPINE W/O DYE: CPT | Mod: 26

## 2018-02-20 PROCEDURE — 99233 SBSQ HOSP IP/OBS HIGH 50: CPT

## 2018-02-20 PROCEDURE — 72148 MRI LUMBAR SPINE W/O DYE: CPT | Mod: 26

## 2018-02-20 PROCEDURE — 99232 SBSQ HOSP IP/OBS MODERATE 35: CPT | Mod: 57

## 2018-02-20 NOTE — PATIENT PROFILE ADULT. - PATIENT REPRESENTATIVE NAME
----- Message from Yelena Maloney sent at 1/17/2017  9:01 AM CST -----  Contact: [phone]  Patient is requesting to come in tomorrow instead of monday   Ange rojas

## 2018-02-20 NOTE — PHYSICAL THERAPY INITIAL EVALUATION ADULT - PATIENT PROFILE REVIEW, REHAB EVAL
PT orders received-->no formal activity order. Consult with GALE SHELBY-->pt OK to participate in PT evaluation and ambulate with PT./yes

## 2018-02-20 NOTE — CONSULT NOTE ADULT - ASSESSMENT
EKG - NSR     A/P     1) Chest pain - has exertional component , EKG normal will get 2d echo and nuclear stress test to r/o ischemia     2) Hydrocephalus - pt seen by neurosurgery will need  shunt

## 2018-02-20 NOTE — PROGRESS NOTE ADULT - SUBJECTIVE AND OBJECTIVE BOX
OVERNIGHT EVENTS: Continued unsteady gait and memory issues    Vital Signs Last 24 Hrs  T(C): 37.1 (2018 10:13), Max: 37.1 (2018 10:13)  T(F): 98.7 (2018 10:13), Max: 98.7 (2018 10:13)  HR: 91 (2018 10:13) (72 - 101)  BP: 123/84 (2018 10:13) (111/80 - 123/84)  BP(mean): --  RR: 20 (2018 10:13) (18 - 20)  SpO2: 99% (2018 10:13) (99% - 100%)      PHYSICAL EXAM:  Mental Staus: Awake, Alert oriented x 3, understands english.     Incision/Wound: c/d/i    TUBES/LINES:  [ ] A-line   [ ] Lumbar Drain:   [ ] Ventriculostomy:  [ ] Other    DIET:  [ ] NPO  [ ] Regular    LABS:                        13.5   8.01  )-----------( 215      ( 2018 06:25 )             40.3     02-20    136  |  98  |  12  ----------------------------<  104<H>  4.1   |  25  |  0.65    Ca    9.2      2018 06:25  Phos  3.3     02-20  Mg     1.9     02-20    TPro  7.3  /  Alb  4.1  /  TBili  0.5  /  DBili  x   /  AST  25  /  ALT  30  /  AlkPhos  74  02-20    PT/INR - ( 2018 06:25 )   PT: 11.7 SEC;   INR: 1.05          PTT - ( 2018 06:25 )  PTT:35.5 SEC  Urinalysis Basic - ( 2018 09:41 )    Color: COLORL / Appearance: CLEAR / S.010 / pH: 6.5  Gluc: NEGATIVE / Ketone: NEGATIVE  / Bili: NEGATIVE / Urobili: NORMAL mg/dL   Blood: NEGATIVE / Protein: NEGATIVE mg/dL / Nitrite: NEGATIVE   Leuk Esterase: NEGATIVE / RBC: 0-2 / WBC x   Sq Epi: OCC / Non Sq Epi: x / Bacteria: x      CAPILLARY BLOOD GLUCOSE          CULTURES:      CSF Analysis:         Drug Levels:       Allergies    No Known Allergies    Intolerances        MEDICATIONS:  Antibiotics:    Neuro:    Anticoagulation    OTHER:  aluminum hydroxide/magnesium hydroxide/simethicone Suspension 30 milliLiter(s) Oral every 4 hours PRN    IVF:        DVT PROPHYLAXIS:  [] Venodynes                                [] Heparin/Lovenox    RADIOLOGY & ADDITIONAL TESTS: OVERNIGHT EVENTS: Continued unsteady gait and memory issues     Vital Signs Last 24 Hrs  T(C): 37.1 (2018 10:13), Max: 37.1 (2018 10:13)  T(F): 98.7 (2018 10:13), Max: 98.7 (2018 10:13)  HR: 91 (2018 10:13) (72 - 101)  BP: 123/84 (2018 10:13) (111/80 - 123/84)  BP(mean): --  RR: 20 (2018 10:13) (18 - 20)  SpO2: 99% (2018 10:13) (99% - 100%)      PHYSICAL EXAM:  Mental Staus: Awake, Alert oriented x 3, understands english.  Motor 5/5  Sensory intact  No facial      DIET:  [ ] NPO  [ ] Regular    LABS:                        13.5   8.01  )-----------( 215      ( 2018 06:25 )             40.3     02-20    136  |  98  |  12  ----------------------------<  104<H>  4.1   |  25  |  0.65    Ca    9.2      2018 06:25  Phos  3.3     02-20  Mg     1.9     02-20    TPro  7.3  /  Alb  4.1  /  TBili  0.5  /  DBili  x   /  AST  25  /  ALT  30  /  AlkPhos  74  02-20    PT/INR - ( 2018 06:25 )   PT: 11.7 SEC;   INR: 1.05          PTT - ( 2018 06:25 )  PTT:35.5 SEC  Urinalysis Basic - ( 2018 09:41 )    Color: COLORL / Appearance: CLEAR / S.010 / pH: 6.5  Gluc: NEGATIVE / Ketone: NEGATIVE  / Bili: NEGATIVE / Urobili: NORMAL mg/dL   Blood: NEGATIVE / Protein: NEGATIVE mg/dL / Nitrite: NEGATIVE   Leuk Esterase: NEGATIVE / RBC: 0-2 / WBC x   Sq Epi: OCC / Non Sq Epi: x / Bacteria: x    MEDICATIONS:  Antibiotics:    Neuro:    Anticoagulation    OTHER:  aluminum hydroxide/magnesium hydroxide/simethicone Suspension 30 milliLiter(s) Oral every 4 hours PRN      < from: MR Head No Cont (18 @ 04:15) >    Decrease flow related signal is seen through the cerebral aqueduct and   fourth ventricle region with some flow seen in the basal cistern region.    Impression: Decrease flow related signal is seen through the cerebral   aqueduct and fourth ventricle region with some flow seen through the   basal cistern region.    < end of copied text >

## 2018-02-20 NOTE — CONSULT NOTE ADULT - SUBJECTIVE AND OBJECTIVE BOX
Romeo Garcia MD  Interventional Cardiology  [place] Office : [address] [place] [address] [place] [address]  Tel:   [place] Office : [address] [facility] [place] [address]  Tel: [phone]  Cell : [phone]    HISTORY OF PRESENT ILLNESS:  62F w/ hx of MAC lung infection s/p 6mo antibiotics presents to [facility] w/ complaint of 3d of difficulty walking. Patient reports that over the last 3d she has had to take small steps to walk to maintain balance. She has not had any trauma, fall, or back pain associated w/ inability to ambulate normally. Additionally she reports that her urinary frequency has increased over the last few days w/o painful urination or blood in urine as well has been forgetful over the last few weeks. She has not had any fevers (although reported to ED subjective fever 3wk prior), chills, headache, neck stiffness, chest pain, sob, cough, n/v/d, or new rashes on her body. She did report intermittent mid-abdominal pain hard to characterize. Additionally, she reports traveling to [place] 1mo prior. Pt had CT head where she was found to have hydrocephalus, needs  shunt, complains of having chest pressure on exertion that started about 2 weeks ago, no SOB no palpitations    PAST MEDICAL & SURGICAL HISTORY:  Mycobacterium avium infection  Positive PPD, treated  No significant past surgical history    	    MEDICATIONS:          aluminum hydroxide/magnesium hydroxide/simethicone Suspension 30 milliLiter(s) Oral every 4 hours PRN          FAMILY HISTORY:  Family history of hypertension (Sibling)        Allergies    No Known Allergies    Intolerances    	      PHYSICAL EXAM:  T(C): 37.1 (02-20-18 @ 10:13), Max: 37.1 (02-20-18 @ 10:13)  HR: 91 (02-20-18 @ 10:13) (72 - 101)  BP: 123/84 (02-20-18 @ 10:13) (111/80 - 123/84)  RR: 20 (02-20-18 @ 10:13) (18 - 20)  SpO2: 99% (02-20-18 @ 10:13) (99% - 100%)  Wt(kg): --  I&O's Summary      Appearance: Normal	  HEENT:   Normal oral mucosa, PERRL, EOMI	  Cardiovascular: Normal S1 S2, No JVD, No murmurs, No edema  Respiratory: Lungs clear to auscultation	  Gastrointestinal:  Soft, Non-tender, + BS	  Extremities: Normal range of motion, No clubbing, cyanosis or edema    LABS:	 	    CARDIAC MARKERS:                                  13.5   8.01  )-----------( 215      ( 20 Feb 2018 06:25 )             40.3     02-20    136  |  98  |  12  ----------------------------<  104<H>  4.1   |  25  |  0.65    Ca    9.2      20 Feb 2018 06:25  Phos  3.3     02-20  Mg     1.9     02-20    TPro  7.3  /  Alb  4.1  /  TBili  0.5  /  DBili  x   /  AST  25  /  ALT  30  /  AlkPhos  74  02-20    proBNP:   Lipid Profile:   HgA1c:   TSH:     ASSESSMENT/PLAN:

## 2018-02-20 NOTE — PHYSICAL THERAPY INITIAL EVALUATION ADULT - PERTINENT HX OF CURRENT PROBLEM, REHAB EVAL
Patient is a 62 year old female admitted to [facility] on 2/19 after 3 days of difficulty walking. PMH includes: MAC lung infection s/p 6mo antibiotics. Head CT shows: Marked hydrocephalus with periventricular white matter T2 hyperintensity.

## 2018-02-20 NOTE — PHYSICAL THERAPY INITIAL EVALUATION ADULT - ADDITIONAL COMMENTS
Patient reports she lives in a private house with 5-6 steps to enter. Patient reports she was previously independent in all ADLs and did not use an assistive device for ambulation.     Patient was left semi-supine in bed as found, all lines/tubes intact and call ying within reach, RN Angely ibarra

## 2018-02-20 NOTE — PROGRESS NOTE ADULT - PROBLEM SELECTOR PLAN 2
pt with intermittent chest pain with exertion  - appreciate cardio recs  - nuclear stress test prior to surgical intervention - neurosx aware.

## 2018-02-20 NOTE — PHYSICAL THERAPY INITIAL EVALUATION ADULT - GAIT DEVIATIONS NOTED, PT EVAL
decreased stride length/decreased weight-shifting ability/decreased step length/increased time in double stance/decreased kristina

## 2018-02-20 NOTE — PROGRESS NOTE ADULT - SUBJECTIVE AND OBJECTIVE BOX
Patient is a 62y old  Female who presents with a chief complaint of 62F w/ difficulty walking over last 2-3d (2018 13:43)      SUBJECTIVE / OVERNIGHT EVENTS:  pt still with dizziness when standing.  denies headache.  has poor memory over the last few weeks.      MEDICATIONS  (STANDING):    MEDICATIONS  (PRN):  aluminum hydroxide/magnesium hydroxide/simethicone Suspension 30 milliLiter(s) Oral every 4 hours PRN Dyspepsia      Vital Signs Last 24 Hrs  T(C): 36.8 (2018 14:23), Max: 37.1 (2018 10:13)  T(F): 98.2 (2018 14:23), Max: 98.7 (2018 10:13)  HR: 96 (:) (72 - 101)  BP: 135/77 (2018 14:23) (111/80 - 135/77)  BP(mean): --  RR: 20 (2018 14:23) (18 - 20)  SpO2: 100% (2018 14:) (99% - 100%)  CAPILLARY BLOOD GLUCOSE    PHYSICAL EXAM:  GENERAL: NAD, well-developed  HEAD:  Atraumatic, Normocephalic  EYES: EOMI, conjunctiva and sclera clear  NECK: Supple, No JVD  CHEST/LUNG: Clear to auscultation bilaterally; No wheeze  HEART: Regular rate and rhythm; No murmurs  ABDOMEN: Soft, Nontender, Nondistended; Bowel sounds present  EXTREMITIES:  2+ Peripheral Pulses, No edema  PSYCH: AAOx3  NEUROLOGY: non-focal  SKIN: No rashes or lesions    LABS:                        13.5   8.01  )-----------( 215      ( 2018 06:25 )             40.3     02-20    136  |  98  |  12  ----------------------------<  104<H>  4.1   |  25  |  0.65    Ca    9.2      2018 06:25  Phos  3.3     02-20  Mg     1.9     02-20    TPro  7.3  /  Alb  4.1  /  TBili  0.5  /  DBili  x   /  AST  25  /  ALT  30  /  AlkPhos  74  02-20    PT/INR - ( 2018 06:25 )   PT: 11.7 SEC;   INR: 1.05          PTT - ( 2018 06:25 )  PTT:35.5 SEC  CARDIAC MARKERS ( 2018 07:50 )  x     / < 0.06 ng/mL / 117 u/L / 1.98 ng/mL / x          Urinalysis Basic - ( 2018 09:41 )    Color: COLORL / Appearance: CLEAR / S.010 / pH: 6.5  Gluc: NEGATIVE / Ketone: NEGATIVE  / Bili: NEGATIVE / Urobili: NORMAL mg/dL   Blood: NEGATIVE / Protein: NEGATIVE mg/dL / Nitrite: NEGATIVE   Leuk Esterase: NEGATIVE / RBC: 0-2 / WBC x   Sq Epi: OCC / Non Sq Epi: x / Bacteria: x

## 2018-02-21 LAB
ALBUMIN SERPL ELPH-MCNC: 4.2 G/DL — SIGNIFICANT CHANGE UP (ref 3.3–5)
ALP SERPL-CCNC: 75 U/L — SIGNIFICANT CHANGE UP (ref 40–120)
ALT FLD-CCNC: 30 U/L — SIGNIFICANT CHANGE UP (ref 4–33)
AST SERPL-CCNC: 26 U/L — SIGNIFICANT CHANGE UP (ref 4–32)
BACTERIA UR CULT: SIGNIFICANT CHANGE UP
BASOPHILS # BLD AUTO: 0.02 K/UL — SIGNIFICANT CHANGE UP (ref 0–0.2)
BASOPHILS NFR BLD AUTO: 0.2 % — SIGNIFICANT CHANGE UP (ref 0–2)
BILIRUB SERPL-MCNC: 0.4 MG/DL — SIGNIFICANT CHANGE UP (ref 0.2–1.2)
BLD GP AB SCN SERPL QL: NEGATIVE — SIGNIFICANT CHANGE UP
BUN SERPL-MCNC: 11 MG/DL — SIGNIFICANT CHANGE UP (ref 7–23)
CALCIUM SERPL-MCNC: 9.2 MG/DL — SIGNIFICANT CHANGE UP (ref 8.4–10.5)
CHLORIDE SERPL-SCNC: 97 MMOL/L — LOW (ref 98–107)
CO2 SERPL-SCNC: 27 MMOL/L — SIGNIFICANT CHANGE UP (ref 22–31)
CREAT SERPL-MCNC: 0.73 MG/DL — SIGNIFICANT CHANGE UP (ref 0.5–1.3)
EOSINOPHIL # BLD AUTO: 0.18 K/UL — SIGNIFICANT CHANGE UP (ref 0–0.5)
EOSINOPHIL NFR BLD AUTO: 1.9 % — SIGNIFICANT CHANGE UP (ref 0–6)
GLUCOSE SERPL-MCNC: 101 MG/DL — HIGH (ref 70–99)
HBA1C BLD-MCNC: 5.8 % — HIGH (ref 4–5.6)
HCT VFR BLD CALC: 40.7 % — SIGNIFICANT CHANGE UP (ref 34.5–45)
HGB BLD-MCNC: 13.7 G/DL — SIGNIFICANT CHANGE UP (ref 11.5–15.5)
IMM GRANULOCYTES # BLD AUTO: 0.03 # — SIGNIFICANT CHANGE UP
IMM GRANULOCYTES NFR BLD AUTO: 0.3 % — SIGNIFICANT CHANGE UP (ref 0–1.5)
LYMPHOCYTES # BLD AUTO: 4.04 K/UL — HIGH (ref 1–3.3)
LYMPHOCYTES # BLD AUTO: 43.4 % — SIGNIFICANT CHANGE UP (ref 13–44)
MCHC RBC-ENTMCNC: 29.1 PG — SIGNIFICANT CHANGE UP (ref 27–34)
MCHC RBC-ENTMCNC: 33.7 % — SIGNIFICANT CHANGE UP (ref 32–36)
MCV RBC AUTO: 86.4 FL — SIGNIFICANT CHANGE UP (ref 80–100)
MONOCYTES # BLD AUTO: 0.7 K/UL — SIGNIFICANT CHANGE UP (ref 0–0.9)
MONOCYTES NFR BLD AUTO: 7.5 % — SIGNIFICANT CHANGE UP (ref 2–14)
NEUTROPHILS # BLD AUTO: 4.34 K/UL — SIGNIFICANT CHANGE UP (ref 1.8–7.4)
NEUTROPHILS NFR BLD AUTO: 46.7 % — SIGNIFICANT CHANGE UP (ref 43–77)
NRBC # FLD: 0 — SIGNIFICANT CHANGE UP
PLATELET # BLD AUTO: 214 K/UL — SIGNIFICANT CHANGE UP (ref 150–400)
PMV BLD: 11.4 FL — SIGNIFICANT CHANGE UP (ref 7–13)
POTASSIUM SERPL-MCNC: 4.4 MMOL/L — SIGNIFICANT CHANGE UP (ref 3.5–5.3)
POTASSIUM SERPL-SCNC: 4.4 MMOL/L — SIGNIFICANT CHANGE UP (ref 3.5–5.3)
PROT SERPL-MCNC: 7.4 G/DL — SIGNIFICANT CHANGE UP (ref 6–8.3)
RBC # BLD: 4.71 M/UL — SIGNIFICANT CHANGE UP (ref 3.8–5.2)
RBC # FLD: 12.2 % — SIGNIFICANT CHANGE UP (ref 10.3–14.5)
RH IG SCN BLD-IMP: POSITIVE — SIGNIFICANT CHANGE UP
SODIUM SERPL-SCNC: 135 MMOL/L — SIGNIFICANT CHANGE UP (ref 135–145)
WBC # BLD: 9.31 K/UL — SIGNIFICANT CHANGE UP (ref 3.8–10.5)
WBC # FLD AUTO: 9.31 K/UL — SIGNIFICANT CHANGE UP (ref 3.8–10.5)

## 2018-02-21 PROCEDURE — 93306 TTE W/DOPPLER COMPLETE: CPT | Mod: 26

## 2018-02-21 PROCEDURE — 99233 SBSQ HOSP IP/OBS HIGH 50: CPT

## 2018-02-21 PROCEDURE — 93016 CV STRESS TEST SUPVJ ONLY: CPT | Mod: GC

## 2018-02-21 PROCEDURE — 78452 HT MUSCLE IMAGE SPECT MULT: CPT | Mod: 26

## 2018-02-21 PROCEDURE — 93018 CV STRESS TEST I&R ONLY: CPT | Mod: GC

## 2018-02-21 PROCEDURE — 99232 SBSQ HOSP IP/OBS MODERATE 35: CPT

## 2018-02-21 NOTE — PROGRESS NOTE ADULT - SUBJECTIVE AND OBJECTIVE BOX
Romeo Garcia MD  Interventional Cardiology  [place] Office : [address] [place] [address] [place] [address]  Tel:   [place] Office : [address] [facility] [place] [address]  Tel: [phone]  Cell : [phone]    Subjective : Pt lying in bed comfortable, not in distress, denies any chest pain or SOB  	  MEDICATIONS:    aluminum hydroxide/magnesium hydroxide/simethicone Suspension 30 milliLiter(s) Oral every 4 hours PRN      PHYSICAL EXAM:  T(C): 36.3 (02-21-18 @ 05:07), Max: 37.1 (02-20-18 @ 18:14)  HR: 78 (02-21-18 @ 05:07) (76 - 96)  BP: 124/76 (02-21-18 @ 05:07) (124/76 - 138/88)  RR: 18 (02-21-18 @ 05:07) (18 - 20)  SpO2: 100% (02-21-18 @ 05:07) (99% - 100%)  Wt(kg): --  I&O's Summary        Appearance: Normal	  HEENT:   Normal oral mucosa, PERRL, EOMI	  Cardiovascular: Normal S1 S2, No JVD, No murmurs, No edema  Respiratory: Lungs clear to auscultation	  Gastrointestinal:  Soft, Non-tender, + BS	  Extremities: Normal range of motion, No clubbing, cyanosis or edema                                    13.7   9.31  )-----------( 214      ( 21 Feb 2018 05:12 )             40.7     02-21    135  |  97<L>  |  11  ----------------------------<  101<H>  4.4   |  27  |  0.73    Ca    9.2      21 Feb 2018 05:12  Phos  3.3     02-20  Mg     1.9     02-20    TPro  7.4  /  Alb  4.2  /  TBili  0.4  /  DBili  x   /  AST  26  /  ALT  30  /  AlkPhos  75  02-21    proBNP:   Lipid Profile:   HgA1c: Hemoglobin A1C, Whole Blood: 5.8 % (02-21 @ 05:12)    TSH:

## 2018-02-21 NOTE — PROGRESS NOTE ADULT - SUBJECTIVE AND OBJECTIVE BOX
Patient is a 62y old  Female who presents with a chief complaint of 62F w/ difficulty walking over last 2-3d (19 Feb 2018 13:43)      SUBJECTIVE / OVERNIGHT EVENTS:  intermittent headaches persist along with dizziness at times.  denies sob or chest pain.      MEDICATIONS  (STANDING):    MEDICATIONS  (PRN):  aluminum hydroxide/magnesium hydroxide/simethicone Suspension 30 milliLiter(s) Oral every 4 hours PRN Dyspepsia      Vital Signs Last 24 Hrs  T(C): 36.3 (21 Feb 2018 05:07), Max: 37.1 (20 Feb 2018 18:14)  T(F): 97.4 (21 Feb 2018 05:07), Max: 98.7 (20 Feb 2018 18:14)  HR: 78 (21 Feb 2018 05:07) (76 - 96)  BP: 124/76 (21 Feb 2018 05:07) (124/76 - 138/88)  BP(mean): --  RR: 18 (21 Feb 2018 05:07) (18 - 20)  SpO2: 100% (21 Feb 2018 05:07) (99% - 100%)  CAPILLARY BLOOD GLUCOSE      PHYSICAL EXAM:  GENERAL: NAD, well-developed  HEAD:  Atraumatic, Normocephalic  EYES: EOMI, conjunctiva and sclera clear  NECK: Supple, No JVD  CHEST/LUNG: Clear to auscultation bilaterally; No wheeze  HEART: Regular rate and rhythm; No murmurs  ABDOMEN: Soft, Nontender, Nondistended; Bowel sounds present  EXTREMITIES:  2+ Peripheral Pulses, No edema  PSYCH: AAOx3  NEUROLOGY: non-focal  SKIN: No rashes or lesions    LABS:                        13.7   9.31  )-----------( 214      ( 21 Feb 2018 05:12 )             40.7     02-21    135  |  97<L>  |  11  ----------------------------<  101<H>  4.4   |  27  |  0.73    Ca    9.2      21 Feb 2018 05:12  Phos  3.3     02-20  Mg     1.9     02-20    TPro  7.4  /  Alb  4.2  /  TBili  0.4  /  DBili  x   /  AST  26  /  ALT  30  /  AlkPhos  75  02-21    PT/INR - ( 20 Feb 2018 06:25 )   PT: 11.7 SEC;   INR: 1.05          PTT - ( 20 Feb 2018 06:25 )  PTT:35.5 SEC

## 2018-02-22 ENCOUNTER — TRANSCRIPTION ENCOUNTER (OUTPATIENT)
Age: 63
End: 2018-02-22

## 2018-02-22 LAB
BASOPHILS # BLD AUTO: 0.02 K/UL — SIGNIFICANT CHANGE UP (ref 0–0.2)
BASOPHILS NFR BLD AUTO: 0.2 % — SIGNIFICANT CHANGE UP (ref 0–2)
BUN SERPL-MCNC: 12 MG/DL — SIGNIFICANT CHANGE UP (ref 7–23)
CALCIUM SERPL-MCNC: 9.4 MG/DL — SIGNIFICANT CHANGE UP (ref 8.4–10.5)
CHLORIDE SERPL-SCNC: 97 MMOL/L — LOW (ref 98–107)
CO2 SERPL-SCNC: 26 MMOL/L — SIGNIFICANT CHANGE UP (ref 22–31)
CREAT SERPL-MCNC: 0.72 MG/DL — SIGNIFICANT CHANGE UP (ref 0.5–1.3)
EOSINOPHIL # BLD AUTO: 0.14 K/UL — SIGNIFICANT CHANGE UP (ref 0–0.5)
EOSINOPHIL NFR BLD AUTO: 1.6 % — SIGNIFICANT CHANGE UP (ref 0–6)
GLUCOSE SERPL-MCNC: 101 MG/DL — HIGH (ref 70–99)
HCT VFR BLD CALC: 42.7 % — SIGNIFICANT CHANGE UP (ref 34.5–45)
HGB BLD-MCNC: 13.8 G/DL — SIGNIFICANT CHANGE UP (ref 11.5–15.5)
IMM GRANULOCYTES # BLD AUTO: 0.03 # — SIGNIFICANT CHANGE UP
IMM GRANULOCYTES NFR BLD AUTO: 0.3 % — SIGNIFICANT CHANGE UP (ref 0–1.5)
LYMPHOCYTES # BLD AUTO: 3.99 K/UL — HIGH (ref 1–3.3)
LYMPHOCYTES # BLD AUTO: 44.2 % — HIGH (ref 13–44)
MCHC RBC-ENTMCNC: 28.3 PG — SIGNIFICANT CHANGE UP (ref 27–34)
MCHC RBC-ENTMCNC: 32.3 % — SIGNIFICANT CHANGE UP (ref 32–36)
MCV RBC AUTO: 87.7 FL — SIGNIFICANT CHANGE UP (ref 80–100)
MONOCYTES # BLD AUTO: 0.63 K/UL — SIGNIFICANT CHANGE UP (ref 0–0.9)
MONOCYTES NFR BLD AUTO: 7 % — SIGNIFICANT CHANGE UP (ref 2–14)
NEUTROPHILS # BLD AUTO: 4.21 K/UL — SIGNIFICANT CHANGE UP (ref 1.8–7.4)
NEUTROPHILS NFR BLD AUTO: 46.7 % — SIGNIFICANT CHANGE UP (ref 43–77)
NRBC # FLD: 0 — SIGNIFICANT CHANGE UP
PLATELET # BLD AUTO: 244 K/UL — SIGNIFICANT CHANGE UP (ref 150–400)
PMV BLD: 11.6 FL — SIGNIFICANT CHANGE UP (ref 7–13)
POTASSIUM SERPL-MCNC: 4.4 MMOL/L — SIGNIFICANT CHANGE UP (ref 3.5–5.3)
POTASSIUM SERPL-SCNC: 4.4 MMOL/L — SIGNIFICANT CHANGE UP (ref 3.5–5.3)
RBC # BLD: 4.87 M/UL — SIGNIFICANT CHANGE UP (ref 3.8–5.2)
RBC # FLD: 12.3 % — SIGNIFICANT CHANGE UP (ref 10.3–14.5)
SODIUM SERPL-SCNC: 136 MMOL/L — SIGNIFICANT CHANGE UP (ref 135–145)
WBC # BLD: 9.02 K/UL — SIGNIFICANT CHANGE UP (ref 3.8–10.5)
WBC # FLD AUTO: 9.02 K/UL — SIGNIFICANT CHANGE UP (ref 3.8–10.5)

## 2018-02-22 PROCEDURE — 99232 SBSQ HOSP IP/OBS MODERATE 35: CPT

## 2018-02-22 PROCEDURE — 99233 SBSQ HOSP IP/OBS HIGH 50: CPT

## 2018-02-22 RX ORDER — SODIUM CHLORIDE 9 MG/ML
1000 INJECTION INTRAMUSCULAR; INTRAVENOUS; SUBCUTANEOUS
Qty: 0 | Refills: 0 | Status: DISCONTINUED | OUTPATIENT
Start: 2018-02-22 | End: 2018-02-23

## 2018-02-22 RX ORDER — LATANOPROST 0.05 MG/ML
1 SOLUTION/ DROPS OPHTHALMIC; TOPICAL AT BEDTIME
Qty: 0 | Refills: 0 | Status: DISCONTINUED | OUTPATIENT
Start: 2018-02-22 | End: 2018-02-24

## 2018-02-22 RX ADMIN — LATANOPROST 1 DROP(S): 0.05 SOLUTION/ DROPS OPHTHALMIC; TOPICAL at 23:46

## 2018-02-22 NOTE — CONSULT NOTE ADULT - ATTENDING COMMENTS
I have reviewed the history, pertinent labs and imaging, and discussed the care with the consult resident.    Plan  for laparoscopic assisted  shunt
Patient was presented on rounds and examined at the bedside. MRI images reviewed with neuroradiologist. Agree with Hx and PE.  Pt has acquired non-communicating hydrocephalus at the level of the aqueduct.  She is Sx with cognitive issues, gait disturbance and urinary incontinence.   Tx as per neurosurgery but will likely need  shunting with CSF analysis.

## 2018-02-22 NOTE — PROGRESS NOTE ADULT - SUBJECTIVE AND OBJECTIVE BOX
NEUROSURGERY    Patient is a 62y old  Female who presents with a chief complaint of 62F w/ difficulty walking over last 2-3d (19 Feb 2018 13:43)    HPI:  Patient deferred translation and discussed history in English as she also speak Yolanda    62F w/ hx of MAC lung infection s/p 6mo antibiotics presents to [facility] w/ complaint of 3d of difficulty walking. Patient reports that over the last 3d she has had to take small steps to walk to maintain balance. She has not had any trauma, fall, or back pain associated w/ inability to ambulate normally. Additionally she reports that her urinary frequency has increased over the last few days w/o painful urination or blood in urine as well has been forgetful over the last few weeks. She has not had any fevers (although reported to ED subjective fever 3wk prior), chills, headache, neck stiffness, chest pain, sob, cough, n/v/d, or new rashes on her body. She did report intermittent mid-abdominal pain hard to characterize. Additionally, she reports traveling to [place] 1mo prior. (19 Feb 2018 13:43)    PREOP CHECK LIST    Pre Dx: Hydrocephalus  Procedure; Placement of  Shunt  Surgeon: Dr. Johanny Ramos    Labs                      13.8   9.02  )-----------( 244      ( 22 Feb 2018 05:56 )             42.7     22 Feb 2018 05:56    136    |  97     |  12     ----------------------------<  101    4.4     |  26     |  0.72     Ca    9.4        22 Feb 2018 05:56    TPro  7.4    /  Alb  4.2    /  TBili  0.4    /  DBili  x      /  AST  26     /  ALT  30     /  AlkPhos  75     21 Feb 2018 05:12    LIVER FUNCTIONS - ( 21 Feb 2018 05:12 )  Alb: 4.2 g/dL / Pro: 7.4 g/dL / ALK PHOS: 75 u/L / ALT: 30 u/L / AST: 26 u/L / GGT: x           Coags  PT/PTT- 11.7/35.5   INR-1.05    Type & screen-B pos    Radiology    < from: MR Head No Cont (02.20.18 @ 04:15) >  Impression: Decrease flow related signal is seen through the cerebral   aqueduct and fourth ventricle region with some flow seen through the   basal cistern region.    < end of copied text >    Orders: written  Consent: obtained  Medically cleared

## 2018-02-22 NOTE — CONSULT NOTE ADULT - SUBJECTIVE AND OBJECTIVE BOX
CC: 62y old Female admitted with a chief complaint of 62F w/ difficulty walking over last 2-3d (19 Feb 2018 13:43), now with diagnosis of new hydrocephalus.    HPI: 62 year old woman with newly diagnosed hydrocephalus, with plan per Neurosurgery for placement of  shunt. Patient has never had abdominal surgery before. Patient with limited English proficiency, interviewed with her son, Eric Escobedo. Denies fevers, chills, nausea, emesis, abdominal pain, change in bowel habits.    PMHx: mycobacterium infection, treated; glaucoma    PSHx: No significant past surgical history    Medications (inpatient): sodium chloride 0.9%. 1000 milliLiter(s) IV Continuous <Continuous>    Medications (PRN):aluminum hydroxide/magnesium hydroxide/simethicone Suspension 30 milliLiter(s) Oral every 4 hours PRN    Allergies: No Known Allergies  (Intolerances: None)  Social Hx: Denies tobacco, EtOH, illicit drug use. Lives alone, sons involved with care.    Physical Exam  T(C): 36.3 (02-22-18 @ 10:46)  HR: 76 (02-22-18 @ 10:46) (75 - 76)  BP: 134/85 (02-22-18 @ 10:46) (123/80 - 135/75)  RR: 18 (02-22-18 @ 10:46) (18 - 18)  SpO2: 100% (02-22-18 @ 10:46) (98% - 100%)  Tmax: T(C): , Max: 36.6 (02-22-18 @ 06:25)    General: well developed, well nourished, NAD  Neuro: alert and oriented, no focal deficits, moves all extremities spontaneously  HEENT: NCAT, anicteric, mucosa moist  Respiratory: airway patent, respirations unlabored  CVS: regular rate and rhythm  Abdomen: soft, nontender, nondistended  Extremities: no edema, sensation and movement grossly intact    Labs:                        13.8   9.02  )-----------( 244      ( 22 Feb 2018 05:56 )             42.7       02-22    136  |  97<L>  |  12  ----------------------------<  101<H>  4.4   |  26  |  0.72    Ca    9.4      22 Feb 2018 05:56    TPro  7.4  /  Alb  4.2  /  TBili  0.4  /  DBili  x   /  AST  26  /  ALT  30  /  AlkPhos  75  02-21    Imaging and other studies:  < from: CT Head No Cont (02.19.18 @ 09:28) >  IMPRESSION:     New hydrocephalus likely at the junction of the aqueduct and the fourth ventricle, since 9/26/2010.    < end of copied text >

## 2018-02-22 NOTE — PROGRESS NOTE ADULT - SUBJECTIVE AND OBJECTIVE BOX
Romeo Garcia MD  Interventional Cardiology  [place] Office : [address] [place] [address]  Tel:   [place] Office : [address] [facility] [place] [address]  Tel: [phone]  Cell : [phone]    Subjective : Pt lying in bed comfortable, not in distress, denies any chest pain or SOB  	  MEDICATIONS:          aluminum hydroxide/magnesium hydroxide/simethicone Suspension 30 milliLiter(s) Oral every 4 hours PRN      sodium chloride 0.9%. 1000 milliLiter(s) IV Continuous <Continuous>      PHYSICAL EXAM:  T(C): 36.3 (02-22-18 @ 10:46), Max: 36.6 (02-22-18 @ 06:25)  HR: 76 (02-22-18 @ 10:46) (75 - 76)  BP: 134/85 (02-22-18 @ 10:46) (123/80 - 135/75)  RR: 18 (02-22-18 @ 10:46) (18 - 18)  SpO2: 100% (02-22-18 @ 10:46) (98% - 100%)  Wt(kg): --  I&O's Summary    Height (cm): 162.56 (02-22 @ 10:01)  Weight (kg): 57 (02-22 @ 10:01)  BMI (kg/m2): 21.6 (02-22 @ 10:01)  BSA (m2): 1.61 (02-22 @ 10:01)    Appearance: Normal	  HEENT:   Normal oral mucosa, PERRL, EOMI	  Cardiovascular: Normal S1 S2, No JVD, No murmurs, No edema  Respiratory: Lungs clear to auscultation	  Gastrointestinal:  Soft, Non-tender, + BS	  Extremities: Normal range of motion, No clubbing, cyanosis or edema                                    13.8   9.02  )-----------( 244      ( 22 Feb 2018 05:56 )             42.7     02-22    136  |  97<L>  |  12  ----------------------------<  101<H>  4.4   |  26  |  0.72    Ca    9.4      22 Feb 2018 05:56    TPro  7.4  /  Alb  4.2  /  TBili  0.4  /  DBili  x   /  AST  26  /  ALT  30  /  AlkPhos  75  02-21    proBNP:   Lipid Profile:   HgA1c:   TSH:

## 2018-02-22 NOTE — PROGRESS NOTE ADULT - SUBJECTIVE AND OBJECTIVE BOX
Patient is a 62y old  Female who presents with a chief complaint of 62F w/ difficulty walking over last 2-3d (19 Feb 2018 13:43)      SUBJECTIVE / OVERNIGHT EVENTS:  pt still with dizziness intermittently.    MEDICATIONS  (STANDING):    MEDICATIONS  (PRN):  aluminum hydroxide/magnesium hydroxide/simethicone Suspension 30 milliLiter(s) Oral every 4 hours PRN Dyspepsia      Vital Signs Last 24 Hrs  T(C): 36.3 (22 Feb 2018 10:46), Max: 36.6 (21 Feb 2018 13:53)  T(F): 97.4 (22 Feb 2018 10:46), Max: 97.8 (21 Feb 2018 13:53)  HR: 76 (22 Feb 2018 10:46) (75 - 76)  BP: 134/85 (22 Feb 2018 10:46) (123/80 - 135/75)  BP(mean): --  RR: 18 (22 Feb 2018 10:46) (18 - 18)  SpO2: 100% (22 Feb 2018 10:46) (98% - 100%)  CAPILLARY BLOOD GLUCOSE      PHYSICAL EXAM:  GENERAL: NAD, well-developed  HEAD:  Atraumatic, Normocephalic  EYES: EOMI, conjunctiva and sclera clear  NECK: Supple, No JVD  CHEST/LUNG: Clear to auscultation bilaterally; No wheeze  HEART: Regular rate and rhythm; No murmurs  ABDOMEN: Soft, Nontender, Nondistended; Bowel sounds present  EXTREMITIES:  2+ Peripheral Pulses, No edema  PSYCH: AAOx3  NEUROLOGY: non-focal  SKIN: No rashes or lesions    LABS:                        13.8   9.02  )-----------( 244      ( 22 Feb 2018 05:56 )             42.7     02-22    136  |  97<L>  |  12  ----------------------------<  101<H>  4.4   |  26  |  0.72    Ca    9.4      22 Feb 2018 05:56    TPro  7.4  /  Alb  4.2  /  TBili  0.4  /  DBili  x   /  AST  26  /  ALT  30  /  AlkPhos  75  02-21

## 2018-02-22 NOTE — PROGRESS NOTE ADULT - PROBLEM SELECTOR PLAN 2
pt with intermittent chest pain with exertion  - appreciate cardio recs  - nuclear stress test does not show any cardiac issues - await cardiac followup

## 2018-02-22 NOTE — CONSULT NOTE ADULT - ASSESSMENT
62 year old F with anticipate placement of  shunt by neurosurgery on 2/23/17, requesting general surgery for intra-abdominal placement.    - will discuss with Dr. Gallardo regarding timing, patient presently on the add-on schedule    --SHWETHA Villarreal, [id number]

## 2018-02-23 ENCOUNTER — APPOINTMENT (OUTPATIENT)
Dept: NEUROSURGERY | Facility: HOSPITAL | Age: 63
End: 2018-02-23

## 2018-02-23 LAB
BASOPHILS # BLD AUTO: 0.02 K/UL — SIGNIFICANT CHANGE UP (ref 0–0.2)
BASOPHILS NFR BLD AUTO: 0.2 % — SIGNIFICANT CHANGE UP (ref 0–2)
BLD GP AB SCN SERPL QL: NEGATIVE — SIGNIFICANT CHANGE UP
BUN SERPL-MCNC: 11 MG/DL — SIGNIFICANT CHANGE UP (ref 7–23)
CALCIUM SERPL-MCNC: 9.1 MG/DL — SIGNIFICANT CHANGE UP (ref 8.4–10.5)
CHLORIDE SERPL-SCNC: 99 MMOL/L — SIGNIFICANT CHANGE UP (ref 98–107)
CLARITY CSF: CLEAR — SIGNIFICANT CHANGE UP
CO2 SERPL-SCNC: 25 MMOL/L — SIGNIFICANT CHANGE UP (ref 22–31)
COLOR CSF: COLORLESS — SIGNIFICANT CHANGE UP
CREAT SERPL-MCNC: 0.71 MG/DL — SIGNIFICANT CHANGE UP (ref 0.5–1.3)
EOSINOPHIL # BLD AUTO: 0.13 K/UL — SIGNIFICANT CHANGE UP (ref 0–0.5)
EOSINOPHIL NFR BLD AUTO: 1.4 % — SIGNIFICANT CHANGE UP (ref 0–6)
GLUCOSE CSF-MCNC: 71 MG/DL — HIGH (ref 40–70)
GLUCOSE SERPL-MCNC: 92 MG/DL — SIGNIFICANT CHANGE UP (ref 70–99)
GRAM STN CSF: SIGNIFICANT CHANGE UP
HCT VFR BLD CALC: 40.7 % — SIGNIFICANT CHANGE UP (ref 34.5–45)
HGB BLD-MCNC: 13.6 G/DL — SIGNIFICANT CHANGE UP (ref 11.5–15.5)
IMM GRANULOCYTES # BLD AUTO: 0.01 # — SIGNIFICANT CHANGE UP
IMM GRANULOCYTES NFR BLD AUTO: 0.1 % — SIGNIFICANT CHANGE UP (ref 0–1.5)
LYMPHOCYTES # BLD AUTO: 3.76 K/UL — HIGH (ref 1–3.3)
LYMPHOCYTES # BLD AUTO: 41 % — SIGNIFICANT CHANGE UP (ref 13–44)
MCHC RBC-ENTMCNC: 28.8 PG — SIGNIFICANT CHANGE UP (ref 27–34)
MCHC RBC-ENTMCNC: 33.4 % — SIGNIFICANT CHANGE UP (ref 32–36)
MCV RBC AUTO: 86.2 FL — SIGNIFICANT CHANGE UP (ref 80–100)
MONOCYTES # BLD AUTO: 0.56 K/UL — SIGNIFICANT CHANGE UP (ref 0–0.9)
MONOCYTES NFR BLD AUTO: 6.1 % — SIGNIFICANT CHANGE UP (ref 2–14)
NEUTROPHILS # BLD AUTO: 4.68 K/UL — SIGNIFICANT CHANGE UP (ref 1.8–7.4)
NEUTROPHILS NFR BLD AUTO: 51.2 % — SIGNIFICANT CHANGE UP (ref 43–77)
NRBC # FLD: 0 — SIGNIFICANT CHANGE UP
NRBC NFR CSF: < 1 CELL/UL — SIGNIFICANT CHANGE UP (ref 0–5)
PLATELET # BLD AUTO: 217 K/UL — SIGNIFICANT CHANGE UP (ref 150–400)
PMV BLD: 11.4 FL — SIGNIFICANT CHANGE UP (ref 7–13)
POTASSIUM SERPL-MCNC: 4.1 MMOL/L — SIGNIFICANT CHANGE UP (ref 3.5–5.3)
POTASSIUM SERPL-SCNC: 4.1 MMOL/L — SIGNIFICANT CHANGE UP (ref 3.5–5.3)
PROT CSF-MCNC: 8.3 MG/DL — LOW (ref 15–45)
RBC # BLD: 4.72 M/UL — SIGNIFICANT CHANGE UP (ref 3.8–5.2)
RBC # CSF: 33 CELL/UL — HIGH (ref 0–0)
RBC # FLD: 12.2 % — SIGNIFICANT CHANGE UP (ref 10.3–14.5)
RH IG SCN BLD-IMP: POSITIVE — SIGNIFICANT CHANGE UP
SODIUM SERPL-SCNC: 137 MMOL/L — SIGNIFICANT CHANGE UP (ref 135–145)
SPECIMEN SOURCE: SIGNIFICANT CHANGE UP
TOTAL CELLS COUNTED, SPINAL FLUID: 0 CELLS — SIGNIFICANT CHANGE UP
WBC # BLD: 9.16 K/UL — SIGNIFICANT CHANGE UP (ref 3.8–10.5)
WBC # FLD AUTO: 9.16 K/UL — SIGNIFICANT CHANGE UP (ref 3.8–10.5)
XANTHOCHROMIA: SIGNIFICANT CHANGE UP

## 2018-02-23 PROCEDURE — 62223 ESTABLISH BRAIN CAVITY SHUNT: CPT | Mod: 62

## 2018-02-23 PROCEDURE — 61781 SCAN PROC CRANIAL INTRA: CPT

## 2018-02-23 PROCEDURE — 62223 ESTABLISH BRAIN CAVITY SHUNT: CPT | Mod: AS

## 2018-02-23 PROCEDURE — 99233 SBSQ HOSP IP/OBS HIGH 50: CPT

## 2018-02-23 PROCEDURE — 70450 CT HEAD/BRAIN W/O DYE: CPT | Mod: 26

## 2018-02-23 RX ORDER — ACETAMINOPHEN 500 MG
650 TABLET ORAL EVERY 6 HOURS
Qty: 0 | Refills: 0 | Status: DISCONTINUED | OUTPATIENT
Start: 2018-02-23 | End: 2018-02-24

## 2018-02-23 RX ORDER — HYDROMORPHONE HYDROCHLORIDE 2 MG/ML
0.5 INJECTION INTRAMUSCULAR; INTRAVENOUS; SUBCUTANEOUS
Qty: 0 | Refills: 0 | Status: DISCONTINUED | OUTPATIENT
Start: 2018-02-23 | End: 2018-02-23

## 2018-02-23 RX ORDER — ONDANSETRON 8 MG/1
4 TABLET, FILM COATED ORAL ONCE
Qty: 0 | Refills: 0 | Status: DISCONTINUED | OUTPATIENT
Start: 2018-02-23 | End: 2018-02-23

## 2018-02-23 RX ORDER — OXYCODONE HYDROCHLORIDE 5 MG/1
5 TABLET ORAL EVERY 4 HOURS
Qty: 0 | Refills: 0 | Status: DISCONTINUED | OUTPATIENT
Start: 2018-02-23 | End: 2018-02-24

## 2018-02-23 RX ORDER — FENTANYL CITRATE 50 UG/ML
50 INJECTION INTRAVENOUS
Qty: 0 | Refills: 0 | Status: DISCONTINUED | OUTPATIENT
Start: 2018-02-23 | End: 2018-02-23

## 2018-02-23 RX ORDER — SODIUM CHLORIDE 9 MG/ML
500 INJECTION INTRAMUSCULAR; INTRAVENOUS; SUBCUTANEOUS ONCE
Qty: 0 | Refills: 0 | Status: COMPLETED | OUTPATIENT
Start: 2018-02-23 | End: 2018-02-23

## 2018-02-23 RX ORDER — CEFAZOLIN SODIUM 1 G
2000 VIAL (EA) INJECTION EVERY 8 HOURS
Qty: 0 | Refills: 0 | Status: COMPLETED | OUTPATIENT
Start: 2018-02-23 | End: 2018-02-24

## 2018-02-23 RX ORDER — FENTANYL CITRATE 50 UG/ML
25 INJECTION INTRAVENOUS
Qty: 0 | Refills: 0 | Status: DISCONTINUED | OUTPATIENT
Start: 2018-02-23 | End: 2018-02-23

## 2018-02-23 RX ADMIN — SODIUM CHLORIDE 80 MILLILITER(S): 9 INJECTION INTRAMUSCULAR; INTRAVENOUS; SUBCUTANEOUS at 21:34

## 2018-02-23 RX ADMIN — Medication 100 MILLIGRAM(S): at 18:05

## 2018-02-23 RX ADMIN — SODIUM CHLORIDE 1000 MILLILITER(S): 9 INJECTION INTRAMUSCULAR; INTRAVENOUS; SUBCUTANEOUS at 15:18

## 2018-02-23 RX ADMIN — SODIUM CHLORIDE 80 MILLILITER(S): 9 INJECTION INTRAMUSCULAR; INTRAVENOUS; SUBCUTANEOUS at 00:00

## 2018-02-23 RX ADMIN — FENTANYL CITRATE 25 MICROGRAM(S): 50 INJECTION INTRAVENOUS at 13:07

## 2018-02-23 RX ADMIN — LATANOPROST 1 DROP(S): 0.05 SOLUTION/ DROPS OPHTHALMIC; TOPICAL at 21:57

## 2018-02-23 RX ADMIN — FENTANYL CITRATE 25 MICROGRAM(S): 50 INJECTION INTRAVENOUS at 12:50

## 2018-02-23 RX ADMIN — FENTANYL CITRATE 25 MICROGRAM(S): 50 INJECTION INTRAVENOUS at 13:30

## 2018-02-23 NOTE — PROGRESS NOTE ADULT - PROBLEM SELECTOR PLAN 1
Increase activity as tolerated  PT consult
OOB  Advance diet as tolerated   Pain control
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
Patient w/ difficulty walking x3d w/ obstructive hydrocephalus on CT  - Neurology consult appreciated - pt would benefit from  shunt on this admission   - fall precautions
Preop for placement of  shunt in AM
1. MRI reviewed with attending, No LP needed. Patient has aqueductal stenosis causing HCP. Patient would benefit from  shunt on this admission.   2. Surgery, risks/benefits d/w patient and spoke to son over the phone.   3. Can plan for placement tomorrow pending clearance or can place on add on later in the week if additional work up needed.   4. D/w medical team  5. Family to obtain disk as they are searching for second opinions  6. Will follow along with you

## 2018-02-23 NOTE — PROGRESS NOTE ADULT - SUBJECTIVE AND OBJECTIVE BOX
Romeo Garcia MD  Interventional Cardiology  [place] Office : [phone] [place] [address] [address]. [address]  Tel:   [place] Office : [address] [facility] [place] [address]  Tel: [phone]  Cell : [phone]    Subjective : Pt lying in bed comfortable, not in distress, denies any chest pain or SOB  	  MEDICATIONS:    ceFAZolin   IVPB 2000 milliGRAM(s) IV Intermittent every 8 hours      acetaminophen   Tablet 650 milliGRAM(s) Oral every 6 hours PRN  acetaminophen   Tablet. 650 milliGRAM(s) Oral every 6 hours PRN  fentaNYL    Injectable 25 MICROGram(s) IV Push every 5 minutes PRN  fentaNYL    Injectable 50 MICROGram(s) IV Push every 5 minutes PRN  HYDROmorphone  Injectable 0.5 milliGRAM(s) IV Push every 10 minutes PRN  ondansetron Injectable 4 milliGRAM(s) IV Push once PRN  oxyCODONE    IR 5 milliGRAM(s) Oral every 4 hours PRN    aluminum hydroxide/magnesium hydroxide/simethicone Suspension 30 milliLiter(s) Oral every 4 hours PRN      latanoprost 0.005% Ophthalmic Solution 1 Drop(s) Both EYES at bedtime  sodium chloride 0.9%. 1000 milliLiter(s) IV Continuous <Continuous>      PHYSICAL EXAM:  T(C): 36.8 (02-23-18 @ 17:00), Max: 36.9 (02-23-18 @ 14:45)  HR: 99 (02-23-18 @ 17:00) (70 - 107)  BP: 126/69 (02-23-18 @ 17:00) (108/66 - 148/78)  RR: 19 (02-23-18 @ 17:00) (11 - 23)  SpO2: 100% (02-23-18 @ 17:00) (97% - 100%)  Wt(kg): --  I&O's Summary    23 Feb 2018 07:01  -  23 Feb 2018 17:57  --------------------------------------------------------  IN: 510 mL / OUT: 400 mL / NET: 110 mL          Appearance: Normal	  HEENT:   Normal oral mucosa, PERRL, EOMI	  Cardiovascular: Normal S1 S2, No JVD, No murmurs, No edema  Respiratory: Lungs clear to auscultation	  Gastrointestinal:  Soft, Non-tender, + BS	  Extremities: Normal range of motion, No clubbing, cyanosis or edema                                    13.6   9.16  )-----------( 217      ( 23 Feb 2018 05:30 )             40.7     02-23    137  |  99  |  11  ----------------------------<  92  4.1   |  25  |  0.71    Ca    9.1      23 Feb 2018 05:30      proBNP:   Lipid Profile:   HgA1c:   TSH:

## 2018-02-23 NOTE — CHART NOTE - NSCHARTNOTEFT_GEN_A_CORE
Procedure:  shunt    Diagnosis/Indication: hydrocephalus    Surgeon: Dr. Gallardo    S: patient seen in PACU doing well after procedure, voiding, minimal pain, denies chest pain or shortness of breath.     O:  T(C): 36.8 (02-23-18 @ 17:00), Max: 36.8 (02-23-18 @ 16:00)  T(F): 98.2 (02-23-18 @ 17:00), Max: 98.2 (02-23-18 @ 16:00)  HR: 98 (02-23-18 @ 18:00) (96 - 107)  BP: 119/69 (02-23-18 @ 18:00) (119/69 - 136/66)  RR: 17 (02-23-18 @ 18:00) (13 - 20)  SpO2: 99% (02-23-18 @ 18:00) (97% - 100%)  Wt(kg): --    Gen: patient is resting in bed comfortably  C/V: vital signs stable on monitor  Pulm: breathing well on 2L NC, O2 sat at 100%  Abd: abdomen soft, nondistended, slightly tender   Neuro: CN intact, sensory/motor function intact bilaterally      A/P: 62yFemale s/p above procedure, recovering well with no complication.     To floor with care per primary team.

## 2018-02-23 NOTE — PROGRESS NOTE ADULT - PROBLEM SELECTOR PROBLEM 1
Hydrocephalus

## 2018-02-23 NOTE — PROGRESS NOTE ADULT - SUBJECTIVE AND OBJECTIVE BOX
Post op Note    Dx:    62y    Female   s/p VPS insertion. Doing well states she does feel different/improved post op.           T(C): 36.8 (02-23-18 @ 16:00), Max: 36.9 (02-23-18 @ 14:45)  HR: 107 (02-23-18 @ 16:15) (70 - 107)  BP: 125/78 (02-23-18 @ 16:15) (108/66 - 148/78)  RR: 20 (02-23-18 @ 16:15) (11 - 23)  SpO2: 100% (02-23-18 @ 16:15) (97% - 100%)  Wt(kg): --      Physical exam  Awake, alert, oriented x 3, PERRL, EOMI  Follows commands, speech clear  AGUERO X4 with good strength   Incision: C/D/I    < from: CT Head No Cont (02.23.18 @ 14:30) >  New postop changes compatible with a right frontal onelia hole and right   frontal shunt catheter. The tip of the shunt catheter is in the third   ventricle region.     There is slight decrease in size of the temporal horns and third   ventricle when compared with the prior exam. Small amount of extra-axial   air is seen involving the right frontal region which is related postop   changes.    There is no evidence acute hemorrhage mass mass effect in posterior fossa   or supratentorial region    Evaluation of the osseous structures with the appropriate window aside   from postop changes appear unremarkable    Extra calvarial soft tissue swelling and staples are seen involving the   high right frontal and right suboccipital region.    The visualized paranasal sinuses mastoid and middle ear regions appear   clear    Impression: New postop changes as described above.    < end of copied text >

## 2018-02-23 NOTE — PROGRESS NOTE ADULT - SUBJECTIVE AND OBJECTIVE BOX
NEUROSURGERY    POSTOP CHECK- POD#0  Placement of  Shunt    Patient is a 62y old  Female who presents with a chief complaint of 62F w/ difficulty walking over last 2-3d (19 Feb 2018 13:43)found to have hydrocephalus with transependymal flow, found to have aqueductal stenosis, Admitted to hospital for placement of  shunt.    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 w/ complaint of 3d of difficulty walking. Patient reports that over the last 3d she has had to take small steps to walk to maintain balance. She has not had any trauma, fall, or back pain associated w/ inability to ambulate normally. Additionally she reports that her urinary frequency has increased over the last few days w/o painful urination or blood in urine as well has been forgetful over the last few weeks. She has not had any fevers (although reported to ED subjective fever 3wk prior), chills, headache, neck stiffness, chest pain, sob, cough, n/v/d, or new rashes on her body. She did report intermittent mid-abdominal pain hard to characterize. Additionally, she reports traveling to [place] 1mo prior. (19 Feb 2018 13:43)    ICU Vital Signs Last 24 Hrs  T(C): 36.9 (23 Feb 2018 19:00), Max: 36.9 (23 Feb 2018 14:45)  T(F): 98.4 (23 Feb 2018 19:00), Max: 98.4 (23 Feb 2018 14:45)  HR: 97 (23 Feb 2018 19:00) (70 - 107)  BP: 129/77 (23 Feb 2018 19:00) (108/66 - 148/78)  BP(mean): --  ABP: --  ABP(mean): --  RR: 18 (23 Feb 2018 19:00) (11 - 23)  SpO2: 100% (23 Feb 2018 19:00) (97% - 100%)    Exam     Awake, alert, responsive, conversant  C/o incisional pain  Pupils = R, EOM intact  FC+ on all 4 ext, AGUERO with good strength 5/5 throughout  Wound dressing dry and intact    Radiology    < from: CT Head No Cont (02.23.18 @ 14:30) >  New postop changes compatible with a right frontal onelia hole and right   frontal shunt catheter. The tip of the shunt catheter is in the third   ventricle region.     There is slight decrease in size of the temporal horns and third   ventricle when compared with the prior exam. Small amount of extra-axial   air is seen involving the right frontal region which is related postop   changes.    There is no evidence acute hemorrhage mass mass effect in posterior fossa   or supratentorial region    Evaluation of the osseous structures with the appropriate window aside   from postop changes appear unremarkable    Extra calvarial soft tissue swelling and staples are seen involving the   high right frontal and right suboccipital region.    The visualized paranasal sinuses mastoid and middle ear regions appear   clear    Impression: New postop changes as described above.      < end of copied text >

## 2018-02-23 NOTE — BRIEF OPERATIVE NOTE - PROCEDURE
<<-----Click on this checkbox to enter Procedure Ventriculoperitoneal shunt placement  02/23/2018  Strata @ 2.0  Active  MIKE

## 2018-02-23 NOTE — PROGRESS NOTE ADULT - SUBJECTIVE AND OBJECTIVE BOX
Patient is a 62y old  Female who presents with a chief complaint of 62F w/ difficulty walking over last 2-3d (19 Feb 2018 13:43)      SUBJECTIVE / OVERNIGHT EVENTS:  nervous about pending surgery.  otherwise no complaints.    MEDICATIONS  (STANDING):  latanoprost 0.005% Ophthalmic Solution 1 Drop(s) Both EYES at bedtime  sodium chloride 0.9%. 1000 milliLiter(s) (80 mL/Hr) IV Continuous <Continuous>    MEDICATIONS  (PRN):  aluminum hydroxide/magnesium hydroxide/simethicone Suspension 30 milliLiter(s) Oral every 4 hours PRN Dyspepsia      Vital Signs Last 24 Hrs  T(C): 36.4 (23 Feb 2018 07:12), Max: 36.6 (22 Feb 2018 16:40)  T(F): 97.5 (23 Feb 2018 07:12), Max: 97.9 (22 Feb 2018 16:40)  HR: 89 (23 Feb 2018 07:12) (74 - 91)  BP: 148/78 (23 Feb 2018 07:12) (108/66 - 148/78)  BP(mean): --  RR: 15 (23 Feb 2018 07:12) (15 - 18)  SpO2: 97% (23 Feb 2018 07:12) (97% - 100%)  CAPILLARY BLOOD GLUCOSE        I&O's Summary      PHYSICAL EXAM:  GENERAL: NAD, well-developed  HEAD:  Atraumatic, Normocephalic  EYES: EOMI, conjunctiva and sclera clear  NECK: Supple, No JVD  CHEST/LUNG: Clear to auscultation bilaterally; No wheeze  HEART: Regular rate and rhythm; No murmurs  ABDOMEN: Soft, Nontender, Nondistended; Bowel sounds present  EXTREMITIES:  2+ Peripheral Pulses, No edema  PSYCH: AAOx3  NEUROLOGY: non-focal  SKIN: No rashes or lesions    LABS:                        13.6   9.16  )-----------( 217      ( 23 Feb 2018 05:30 )             40.7     02-23    137  |  99  |  11  ----------------------------<  92  4.1   |  25  |  0.71    Ca    9.1      23 Feb 2018 05:30

## 2018-02-23 NOTE — BRIEF OPERATIVE NOTE - POST-OP DX
Hydrocephalus associated with congenital aqueduct stenosis  02/23/2018    Active  Jia Almazan
Hydrocephalus associated with congenital aqueduct stenosis  02/23/2018    Active  Jia Almazan

## 2018-02-23 NOTE — BRIEF OPERATIVE NOTE - OPERATION/FINDINGS
hydrocephalus, elevated pressure seen on insertion of VPS
Laparoscopic insertion of  shunt, abdominal portion.

## 2018-02-23 NOTE — BRIEF OPERATIVE NOTE - PROCEDURE
<<-----Click on this checkbox to enter Procedure Ventriculoperitoneal shunt placement  02/23/2018    Active  [email or web address]

## 2018-02-24 ENCOUNTER — TRANSCRIPTION ENCOUNTER (OUTPATIENT)
Age: 63
End: 2018-02-24

## 2018-02-24 VITALS
TEMPERATURE: 99 F | HEART RATE: 80 BPM | SYSTOLIC BLOOD PRESSURE: 127 MMHG | DIASTOLIC BLOOD PRESSURE: 75 MMHG | RESPIRATION RATE: 100 BRPM | OXYGEN SATURATION: 100 %

## 2018-02-24 RX ORDER — ACETAMINOPHEN 500 MG
2 TABLET ORAL
Qty: 0 | Refills: 0 | COMMUNITY
Start: 2018-02-24

## 2018-02-24 RX ORDER — LATANOPROST 0.05 MG/ML
1 SOLUTION/ DROPS OPHTHALMIC; TOPICAL
Qty: 0 | Refills: 0 | COMMUNITY
Start: 2018-02-24

## 2018-02-24 RX ORDER — OXYCODONE HYDROCHLORIDE 5 MG/1
1 TABLET ORAL
Qty: 12 | Refills: 0 | OUTPATIENT
Start: 2018-02-24 | End: 2018-02-26

## 2018-02-24 RX ADMIN — Medication 100 MILLIGRAM(S): at 10:12

## 2018-02-24 RX ADMIN — OXYCODONE HYDROCHLORIDE 5 MILLIGRAM(S): 5 TABLET ORAL at 14:23

## 2018-02-24 RX ADMIN — Medication 30 MILLILITER(S): at 14:22

## 2018-02-24 RX ADMIN — Medication 100 MILLIGRAM(S): at 01:39

## 2018-02-24 RX ADMIN — OXYCODONE HYDROCHLORIDE 5 MILLIGRAM(S): 5 TABLET ORAL at 15:00

## 2018-02-24 NOTE — PROGRESS NOTE ADULT - ASSESSMENT
62F POD1 from  shunt placement    -advance diet as tolerated  -encourage ambulation  -d/c planning as per nsx
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
62F w/ hx of MAC lung infection s/p 6mo antibiotics presents to [facility] w/ complaint of 3d of difficulty walking with acute obstructive hydrocephalus requiring  shunt on this admission.
62y female s/p VPS doing well post op
EKG - NSR   Echo - normal LV fucntion    A/P     1) Chest pain -  EKG normal,  2d echo shows normal LV, and nuclear stress normal    2) Hydrocephalus - for  shunt today
EKG - NSR   Echo - normal LV fucntion    A/P     1) Chest pain -  EKG normal,  2d echo shows normal LV, and nuclear stress normal    2) Hydrocephalus - pt seen by neurosurgery for  shunt tomorrow , normal stress test pt is moderate risk for procedure, Echo shows normal LV
EKG - NSR   Echo - normal LV fucntion    A/P     1) Chest pain -  EKG normal,  2d echo shows normal LV, and nuclear stress normal    2) Hydrocephalus - s/p VPshunt yesterday, says there is improvement with ataxia
EKG - NSR   Echo - normal LV fucntion    A/P     1) Chest pain -  EKG normal,  2d echo shows normal LV, and nuclear stress test prelim looks negative f/u official result    2) Hydrocephalus - pt seen by neurosurgery will need  shunt , pending normal stress test results pt is moderate risk for procedure, Echo shows normal LV
Hydrocephalus  Preop for placement of  Shunt
S/p Placement of programable  Shunt-- Strata 2.0
62 F a/w memory issues and ataxic gait found to have aqueductal stenosis causing hydrocephalus

## 2018-02-24 NOTE — PROGRESS NOTE ADULT - SUBJECTIVE AND OBJECTIVE BOX
Romeo Garcia MD  Interventional Cardiology  [place] Office : [address] [place] [address] [address] [address]  Tel:   [place] Office : [address] [facility] [place] [address]  Tel: [phone]  Cell : [phone]    Subjective : Pt lying in bed comfortable, not in distress, denies any chest pain or SOB, s/p  shunt  	  MEDICATIONS:        acetaminophen   Tablet 650 milliGRAM(s) Oral every 6 hours PRN  acetaminophen   Tablet. 650 milliGRAM(s) Oral every 6 hours PRN  oxyCODONE    IR 5 milliGRAM(s) Oral every 4 hours PRN    aluminum hydroxide/magnesium hydroxide/simethicone Suspension 30 milliLiter(s) Oral every 4 hours PRN      latanoprost 0.005% Ophthalmic Solution 1 Drop(s) Both EYES at bedtime      PHYSICAL EXAM:  T(C): 37.1 (02-24-18 @ 09:33), Max: 37.1 (02-24-18 @ 09:33)  HR: 80 (02-24-18 @ 09:33) (66 - 107)  BP: 127/75 (02-24-18 @ 09:33) (112/87 - 136/66)  RR: 17 (02-24-18 @ 09:33) (11 - 23)  SpO2: 100% (02-24-18 @ 09:33) (95% - 100%)  Wt(kg): --  I&O's Summary    23 Feb 2018 07:01  -  24 Feb 2018 07:00  --------------------------------------------------------  IN: 915 mL / OUT: 1525 mL / NET: -610 mL          Appearance: Normal	  HEENT:   Normal oral mucosa, PERRL, EOMI	  Cardiovascular: Normal S1 S2, No JVD, No murmurs, No edema  Respiratory: Lungs clear to auscultation	  Gastrointestinal:  Soft, Non-tender, + BS	  Extremities: Normal range of motion, No clubbing, cyanosis or edema                                    13.6   9.16  )-----------( 217      ( 23 Feb 2018 05:30 )             40.7     02-23    137  |  99  |  11  ----------------------------<  92  4.1   |  25  |  0.71    Ca    9.1      23 Feb 2018 05:30      proBNP:   Lipid Profile:   HgA1c:   TSH:

## 2018-02-24 NOTE — DISCHARGE NOTE ADULT - CARE PLAN
Principal Discharge DX:	Hydrocephalus  Goal:	s/p VPS strata set to 2.0  Assessment and plan of treatment:	Follow up in 1 week with Dr Ramos  Pain control

## 2018-02-24 NOTE — PROGRESS NOTE ADULT - PROVIDER SPECIALTY LIST ADULT
Anesthesia
Cardiology
Hospitalist
Neurosurgery
Surgery
Neurosurgery

## 2018-02-24 NOTE — PROGRESS NOTE ADULT - SUBJECTIVE AND OBJECTIVE BOX
ANESTHESIA POSTOP CHECK    62y Female POSTOP DAY 1    Vital Signs Last 24 Hrs  T(C): 37.1 (24 Feb 2018 09:33), Max: 37.1 (24 Feb 2018 09:33)  T(F): 98.7 (24 Feb 2018 09:33), Max: 98.7 (24 Feb 2018 09:33)  HR: 80 (24 Feb 2018 09:33) (66 - 107)  BP: 127/75 (24 Feb 2018 09:33) (112/87 - 136/66)  BP(mean): --  RR: 17 (24 Feb 2018 09:33) (13 - 20)  SpO2: 100% (24 Feb 2018 09:33) (95% - 100%)  I&O's Summary    23 Feb 2018 07:01  -  24 Feb 2018 07:00  --------------------------------------------------------  IN: 915 mL / OUT: 1525 mL / NET: -610 mL        [X ] NO APPARENT ANESTHESIA COMPLICATIONS      Comments:

## 2018-02-24 NOTE — DISCHARGE NOTE ADULT - CARE PROVIDER_API CALL
Johanny Ramos), [facility] Neurosurgery  General  [address] [address]  Phone: [phone]  Fax: [phone]

## 2018-02-24 NOTE — DISCHARGE NOTE ADULT - HOSPITAL COURSE
This is a 62y female pw dizziness and ataxia found to have HCP. Partient was taken to OR on 2/23 for insertion of VPS - strata valve @ 2.0  Tolerated procedure well, posr op pain ok with oral meds. PT cleared for home, stable for dc

## 2018-02-24 NOTE — DISCHARGE NOTE ADULT - CONDITIONS AT DISCHARGE
patient alert and orientedx4, head dressing dry and intact, tolerating as ordered, discharge instruction given as ordered

## 2018-02-24 NOTE — PROGRESS NOTE ADULT - ATTENDING COMMENTS
I have reviewed the history, pertinent labs and imaging, and discussed the care with the consult resident.  Plan  passing flatus, tolerating diet - dispo per neurosurgery and medicine teams

## 2018-02-24 NOTE — DISCHARGE NOTE ADULT - MEDICATION SUMMARY - MEDICATIONS TO TAKE
I will START or STAY ON the medications listed below when I get home from the hospital:    acetaminophen 325 mg oral tablet  -- 2 tab(s) by mouth every 6 hours, As needed, For Temp greater than 38 C (100.4 F)  -- Indication: For fever    acetaminophen 325 mg oral tablet  -- 2 tab(s) by mouth every 6 hours, As needed, Mild Pain (1 - 3)  -- Indication: For Pain    oxyCODONE 5 mg oral tablet  -- 1 tab(s) by mouth every 6 hours, As Needed -Moderate Pain (4 - 6) MDD:4  -- Indication: For Pain    aluminum hydroxide-magnesium hydroxide 200 mg-200 mg/5 mL oral suspension  -- 30 milliliter(s) by mouth every 4 hours, As needed, Dyspepsia  -- Indication: For gi    latanoprost 0.005% ophthalmic solution  -- 1 drop(s) to each affected eye once a day (at bedtime)  -- Indication: For Home med

## 2018-02-24 NOTE — PROGRESS NOTE ADULT - SUBJECTIVE AND OBJECTIVE BOX
Surgery Progress Note     S: No events overnight. Patient complaining of mild abdominal distension and discomfort with eating.     MEDICATIONS  (STANDING):  ceFAZolin   IVPB 2000 milliGRAM(s) IV Intermittent every 8 hours  latanoprost 0.005% Ophthalmic Solution 1 Drop(s) Both EYES at bedtime    MEDICATIONS  (PRN):  acetaminophen   Tablet 650 milliGRAM(s) Oral every 6 hours PRN For Temp greater than 38 C (100.4 F)  acetaminophen   Tablet. 650 milliGRAM(s) Oral every 6 hours PRN Mild Pain (1 - 3)  aluminum hydroxide/magnesium hydroxide/simethicone Suspension 30 milliLiter(s) Oral every 4 hours PRN Dyspepsia  oxyCODONE    IR 5 milliGRAM(s) Oral every 4 hours PRN Moderate Pain (4 - 6)      Physical Exam:    Vital Signs Last 24 Hrs  T(C): 37.1 (24 Feb 2018 09:33), Max: 37.1 (24 Feb 2018 09:33)  T(F): 98.7 (24 Feb 2018 09:33), Max: 98.7 (24 Feb 2018 09:33)  HR: 80 (24 Feb 2018 09:33) (66 - 107)  BP: 127/75 (24 Feb 2018 09:33) (112/87 - 136/66)  BP(mean): --  RR: 17 (24 Feb 2018 09:33) (11 - 23)  SpO2: 100% (24 Feb 2018 09:33) (95% - 100%)    02-23-18 @ 07:01  -  02-24-18 @ 07:00  --------------------------------------------------------  IN: 915 mL / OUT: 1525 mL / NET: -610 mL        Gen: NAD, alert and oriented   Abdominal: Soft, minimally distended, non-tender, incision C/D/I     LABS:                        13.6   9.16  )-----------( 217      ( 23 Feb 2018 05:30 )             40.7     02-23    137  |  99  |  11  ----------------------------<  92  4.1   |  25  |  0.71    Ca    9.1      23 Feb 2018 05:30

## 2018-02-24 NOTE — DISCHARGE NOTE ADULT - PATIENT PORTAL LINK FT
You can access the [facility] Patient Portal, offered by [facility], by registering with the following website: [email or web address]

## 2018-02-26 ENCOUNTER — TRANSCRIPTION ENCOUNTER (OUTPATIENT)
Age: 63
End: 2018-02-26

## 2018-02-27 ENCOUNTER — TRANSCRIPTION ENCOUNTER (OUTPATIENT)
Age: 63
End: 2018-02-27

## 2018-02-28 LAB — BACTERIA CSF CULT: SIGNIFICANT CHANGE UP

## 2018-03-05 ENCOUNTER — APPOINTMENT (OUTPATIENT)
Dept: NEUROSURGERY | Facility: CLINIC | Age: 63
End: 2018-03-05
Payer: MEDICAID

## 2018-03-05 VITALS
HEART RATE: 92 BPM | WEIGHT: 128 LBS | SYSTOLIC BLOOD PRESSURE: 124 MMHG | BODY MASS INDEX: 21.85 KG/M2 | HEIGHT: 64 IN | DIASTOLIC BLOOD PRESSURE: 76 MMHG

## 2018-03-05 PROCEDURE — 99024 POSTOP FOLLOW-UP VISIT: CPT

## 2018-03-07 RX ORDER — LATANOPROST/PF 0.005 %
0.01 DROPS OPHTHALMIC (EYE)
Refills: 0 | Status: ACTIVE | COMMUNITY

## 2018-03-07 RX ORDER — ALUMINUM HYDROXIDE 600 MG/5ML
SUSPENSION, ORAL (FINAL DOSE FORM) ORAL
Refills: 0 | Status: ACTIVE | COMMUNITY

## 2018-03-07 RX ORDER — OXYCODONE 5 MG/1
5 TABLET ORAL
Refills: 0 | Status: ACTIVE | COMMUNITY

## 2018-03-07 RX ORDER — ACETAMINOPHEN 325 MG/1
325 TABLET ORAL
Refills: 0 | Status: ACTIVE | COMMUNITY

## 2018-03-15 ENCOUNTER — OTHER (OUTPATIENT)
Age: 63
End: 2018-03-15

## 2018-05-10 ENCOUNTER — APPOINTMENT (OUTPATIENT)
Dept: NEUROSURGERY | Facility: CLINIC | Age: 63
End: 2018-05-10

## 2018-05-10 ENCOUNTER — OTHER (OUTPATIENT)
Age: 63
End: 2018-05-10

## 2018-05-15 ENCOUNTER — OTHER (OUTPATIENT)
Age: 63
End: 2018-05-15

## 2018-05-21 ENCOUNTER — OTHER (OUTPATIENT)
Age: 63
End: 2018-05-21

## 2018-05-23 ENCOUNTER — APPOINTMENT (OUTPATIENT)
Dept: CT IMAGING | Facility: IMAGING CENTER | Age: 63
End: 2018-05-23
Payer: MEDICAID

## 2018-05-23 ENCOUNTER — OUTPATIENT (OUTPATIENT)
Dept: OUTPATIENT SERVICES | Facility: HOSPITAL | Age: 63
LOS: 1 days | End: 2018-05-23
Payer: MEDICAID

## 2018-05-23 DIAGNOSIS — Z98.2 PRESENCE OF CEREBROSPINAL FLUID DRAINAGE DEVICE: ICD-10-CM

## 2018-05-23 DIAGNOSIS — G91.9 HYDROCEPHALUS, UNSPECIFIED: ICD-10-CM

## 2018-05-23 PROCEDURE — 70450 CT HEAD/BRAIN W/O DYE: CPT

## 2018-05-23 PROCEDURE — 70450 CT HEAD/BRAIN W/O DYE: CPT | Mod: 26

## 2018-06-07 ENCOUNTER — APPOINTMENT (OUTPATIENT)
Dept: NEUROSURGERY | Facility: CLINIC | Age: 63
End: 2018-06-07
Payer: MEDICAID

## 2018-06-07 VITALS
HEART RATE: 72 BPM | WEIGHT: 130 LBS | BODY MASS INDEX: 22.2 KG/M2 | SYSTOLIC BLOOD PRESSURE: 148 MMHG | DIASTOLIC BLOOD PRESSURE: 87 MMHG | HEIGHT: 64 IN

## 2018-06-07 PROCEDURE — 99213 OFFICE O/P EST LOW 20 MIN: CPT

## 2018-06-12 ENCOUNTER — OTHER (OUTPATIENT)
Age: 63
End: 2018-06-12

## 2018-07-03 ENCOUNTER — LABORATORY RESULT (OUTPATIENT)
Age: 63
End: 2018-07-03

## 2018-07-03 ENCOUNTER — OUTPATIENT (OUTPATIENT)
Dept: OUTPATIENT SERVICES | Facility: HOSPITAL | Age: 63
LOS: 1 days | End: 2018-07-03

## 2018-07-03 ENCOUNTER — APPOINTMENT (OUTPATIENT)
Dept: OBGYN | Facility: HOSPITAL | Age: 63
End: 2018-07-03
Payer: MEDICAID

## 2018-07-03 ENCOUNTER — RESULT REVIEW (OUTPATIENT)
Age: 63
End: 2018-07-03

## 2018-07-03 VITALS
WEIGHT: 129 LBS | DIASTOLIC BLOOD PRESSURE: 90 MMHG | SYSTOLIC BLOOD PRESSURE: 119 MMHG | BODY MASS INDEX: 22.14 KG/M2 | HEART RATE: 86 BPM

## 2018-07-03 DIAGNOSIS — Z00.00 ENCOUNTER FOR GENERAL ADULT MEDICAL EXAMINATION W/OUT ABNORMAL FINDINGS: ICD-10-CM

## 2018-07-03 PROCEDURE — 99203 OFFICE O/P NEW LOW 30 MIN: CPT | Mod: GC

## 2018-07-05 DIAGNOSIS — Z01.419 ENCOUNTER FOR GYNECOLOGICAL EXAMINATION (GENERAL) (ROUTINE) WITHOUT ABNORMAL FINDINGS: ICD-10-CM

## 2018-07-05 DIAGNOSIS — Z00.00 ENCOUNTER FOR GENERAL ADULT MEDICAL EXAMINATION WITHOUT ABNORMAL FINDINGS: ICD-10-CM

## 2018-07-05 DIAGNOSIS — Z92.89 PERSONAL HISTORY OF OTHER MEDICAL TREATMENT: ICD-10-CM

## 2018-07-05 LAB
C TRACH RRNA SPEC QL NAA+PROBE: SIGNIFICANT CHANGE UP
HPV HIGH+LOW RISK DNA PNL CVX: SIGNIFICANT CHANGE UP
N GONORRHOEA RRNA SPEC QL NAA+PROBE: SIGNIFICANT CHANGE UP
SPECIMEN SOURCE: SIGNIFICANT CHANGE UP

## 2018-07-07 LAB — CYTOLOGY SPEC DOC CYTO: SIGNIFICANT CHANGE UP

## 2018-09-05 ENCOUNTER — OTHER (OUTPATIENT)
Age: 63
End: 2018-09-05

## 2018-09-06 ENCOUNTER — OTHER (OUTPATIENT)
Age: 63
End: 2018-09-06

## 2018-09-06 ENCOUNTER — APPOINTMENT (OUTPATIENT)
Dept: NEUROSURGERY | Facility: CLINIC | Age: 63
End: 2018-09-06

## 2019-03-01 ENCOUNTER — OTHER (OUTPATIENT)
Age: 64
End: 2019-03-01

## 2019-03-05 ENCOUNTER — OTHER (OUTPATIENT)
Age: 64
End: 2019-03-05

## 2019-03-15 ENCOUNTER — OTHER (OUTPATIENT)
Age: 64
End: 2019-03-15

## 2019-03-20 ENCOUNTER — OUTPATIENT (OUTPATIENT)
Dept: OUTPATIENT SERVICES | Facility: HOSPITAL | Age: 64
LOS: 1 days | End: 2019-03-20
Payer: MEDICAID

## 2019-03-20 ENCOUNTER — APPOINTMENT (OUTPATIENT)
Dept: CT IMAGING | Facility: CLINIC | Age: 64
End: 2019-03-20
Payer: MEDICAID

## 2019-03-20 DIAGNOSIS — Z98.2 PRESENCE OF CEREBROSPINAL FLUID DRAINAGE DEVICE: ICD-10-CM

## 2019-03-20 DIAGNOSIS — G91.9 HYDROCEPHALUS, UNSPECIFIED: ICD-10-CM

## 2019-03-20 PROCEDURE — 70450 CT HEAD/BRAIN W/O DYE: CPT | Mod: 26

## 2019-03-20 PROCEDURE — 70450 CT HEAD/BRAIN W/O DYE: CPT

## 2019-04-01 ENCOUNTER — OUTPATIENT (OUTPATIENT)
Dept: OUTPATIENT SERVICES | Facility: HOSPITAL | Age: 64
LOS: 1 days | End: 2019-04-01
Payer: MEDICAID

## 2019-04-01 PROCEDURE — G9001: CPT

## 2019-04-11 ENCOUNTER — APPOINTMENT (OUTPATIENT)
Dept: NEUROSURGERY | Facility: CLINIC | Age: 64
End: 2019-04-11
Payer: MEDICAID

## 2019-04-11 VITALS
SYSTOLIC BLOOD PRESSURE: 130 MMHG | WEIGHT: 132 LBS | BODY MASS INDEX: 22.53 KG/M2 | HEART RATE: 92 BPM | HEIGHT: 64 IN | DIASTOLIC BLOOD PRESSURE: 80 MMHG

## 2019-04-11 PROCEDURE — 99213 OFFICE O/P EST LOW 20 MIN: CPT

## 2019-04-12 NOTE — REVIEW OF SYSTEMS
[Negative] : Heme/Lymph [As Noted in HPI] : as noted in HPI [Dizziness] : dizziness [de-identified] : occasional head pressure with dizziness

## 2019-04-12 NOTE — HISTORY OF PRESENT ILLNESS
[FreeTextEntry1] : 63 yo F who initially presented to the [facility] ED in Feb 2018 for sudden onset of difficulty walking, urinary frequency without dysuria, and worsening forgetfulness. Initial CT head revealed hydrocephalus and she underwent right side VPS (strata set to 2.0) placement on 2/23/2018. Today she returns for follow up and states she is doing well. Her previous symptoms completely resolved and now she has mild frontal head pressure and occasional dizziness but denies any other focal neuro deficits. She ambulates without assistive device and able to perform ADLs independently. Surgical incision appears to be healed well and shunt pumps and refills briskly with setting checked @ 2.0 today.

## 2019-04-12 NOTE — ASSESSMENT
[FreeTextEntry1] : This is a 62 yo F with likely aqueductal stenosis who is s/p VPS placement. She has complete resolution of all symptoms and repeat CT scan in 3/2019 continues to show stable ventricle size. Given her current asymptomatic status and stable image, she will remain setting at 2.0. Recommend: \par - okay to travel\par - CT head wo in Dec 2019\par - RTC after image or sooner for new/worsening s/sx

## 2019-04-12 NOTE — PHYSICAL EXAM
[General Appearance - Alert] : alert [General Appearance - In No Acute Distress] : in no acute distress [General Appearance - Well Nourished] : well nourished [General Appearance - Well Developed] : well developed [Clean] : clean [General Appearance - Well-Appearing] : healthy appearing [Dry] : dry [Oriented To Time, Place, And Person] : oriented to person, place, and time [Well-Healed] : well-healed [Impaired Insight] : insight and judgment were intact [Affect] : the affect was normal [Person] : oriented to person [Time] : oriented to time [Place] : oriented to place [Remote Intact] : remote memory intact [Comprehension] : comprehension intact [Cranial Nerves Optic (II)] : visual acuity intact bilaterally,  pupils equal round and reactive to light [Cranial Nerves Oculomotor (III)] : extraocular motion intact [Cranial Nerves Trigeminal (V)] : facial sensation intact symmetrically [Cranial Nerves Glossopharyngeal (IX)] : tongue and palate midline [Cranial Nerves Facial (VII)] : face symmetrical [Cranial Nerves Vestibulocochlear (VIII)] : hearing was intact bilaterally [Cranial Nerves Accessory (XI - Cranial And Spinal)] : head turning and shoulder shrug symmetric [Cranial Nerves Hypoglossal (XII)] : there was no tongue deviation with protrusion [Motor Tone] : muscle tone was normal in all four extremities [Motor Strength] : muscle strength was normal in all four extremities [Sensation Tactile Decrease] : light touch was intact [Sensation Pain / Temperature Decrease] : pain and temperature was intact [Balance] : balance was intact [No Visual Abnormalities] : no visible abnormailities [Sclera] : the sclera and conjunctiva were normal [PERRL With Normal Accommodation] : pupils were equal in size, round, reactive to light, with normal accommodation [Extraocular Movements] : extraocular movements were intact [Hearing Threshold Finger Rub Not [place]] : hearing was normal [Outer Ear] : the ears and nose were normal in appearance [Examination Of The Oral Cavity] : the lips and gums were normal [Neck Appearance] : the appearance of the neck was normal [Respiration, Rhythm And Depth] : normal respiratory rhythm and effort [Abdomen Tenderness] : non-tender [Bowel Sounds] : normal bowel sounds [No Spinal Tenderness] : no spinal tenderness [Skin Color & Pigmentation] : normal skin color and pigmentation [Abnormal Walk] : normal gait [] : no rash [Skin Turgor] : normal skin turgor [Longitudinal] : longitudinal [No Drainage] : without drainage [Normal Skin] : normal [Short Term Intact] : short term memory intact [Registration Intact] : recent registration memory intact [Span Intact] : the attention span was normal [Concentration Intact] : normal concentrating ability [Fluency] : fluency intact [Visual Intact] : visual attention was ~T not ~L decreased [Current Events] : adequate knowledge of current events [Vocabulary] : adequate range of vocabulary [I: Normal Smell] : smell intact bilaterally [Past History] : adequate knowledge of personal past history [5] : L4/5 heel walking 5/5 [2+] : Ankle jerk left 2+ [Able to toe walk] : the patient was able to toe walk [Normal] : normal [Able to heel walk] : the patient was able to heel walk [Erythema] : not erythematous [Tender] : not tender [Indurated] : not indurated [Fluctuant] : not fluctuant [Warm] : not warm [FreeTextEntry1] : right frontal head [Romberg's Sign] : Romberg's sign was negtive

## 2019-04-12 NOTE — REASON FOR VISIT
[Follow-Up: _____] : a [unfilled] follow-up visit [Spouse] : spouse [FreeTextEntry1] : one year post op follow up

## 2019-04-15 DIAGNOSIS — Z71.89 OTHER SPECIFIED COUNSELING: ICD-10-CM

## 2019-06-20 ENCOUNTER — APPOINTMENT (OUTPATIENT)
Dept: NEUROSURGERY | Facility: CLINIC | Age: 64
End: 2019-06-20
Payer: MEDICAID

## 2019-06-20 VITALS
RESPIRATION RATE: 18 BRPM | DIASTOLIC BLOOD PRESSURE: 79 MMHG | OXYGEN SATURATION: 98 % | SYSTOLIC BLOOD PRESSURE: 140 MMHG | HEART RATE: 80 BPM

## 2019-06-20 DIAGNOSIS — Z78.9 OTHER SPECIFIED HEALTH STATUS: ICD-10-CM

## 2019-06-20 DIAGNOSIS — Z92.89 PERSONAL HISTORY OF OTHER MEDICAL TREATMENT: ICD-10-CM

## 2019-06-20 PROCEDURE — 99213 OFFICE O/P EST LOW 20 MIN: CPT

## 2019-06-24 NOTE — HISTORY OF PRESENT ILLNESS
[FreeTextEntry1] : Ms. SHAWNA CASTRO is a 65 yo F who initially presented to the [facility] ED in Feb 2018 for sudden onset of difficulty walking, urinary frequency without dysuria, and worsening forgetfulness. Initial CT head revealed hydrocephalus and she underwent right side VPS (strata set to 2.0) placement on 2/23/2018. Today she returns after recent trip to [place]. She had mild tightness on b/l legs but denies gait disturbance, dizziness, headache, visual changes, urinary dysfunction. Surgical incision appears to be healed well and shunt pumps and refills briskly with setting checked @ 2.0 today.

## 2019-06-24 NOTE — REVIEW OF SYSTEMS
[As Noted in HPI] : as noted in HPI [Negative] : Musculoskeletal [FreeTextEntry5] : b/l LE tightness

## 2019-06-24 NOTE — ASSESSMENT
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 She has excellent recovery and neurologically intact.\par - CT head wo in Dec 2019\par - RTC after image or sooner for new/worsening s/sx

## 2019-06-24 NOTE — PHYSICAL EXAM
[General Appearance - Alert] : alert [General Appearance - In No Acute Distress] : in no acute distress [General Appearance - Well Nourished] : well nourished [General Appearance - Well Developed] : well developed [General Appearance - Well-Appearing] : healthy appearing [Longitudinal] : longitudinal [Clean] : clean [Dry] : dry [Well-Healed] : well-healed [Normal Skin] : normal [No Drainage] : without drainage [Oriented To Time, Place, And Person] : oriented to person, place, and time [Impaired Insight] : insight and judgment were intact [Affect] : the affect was normal [Person] : oriented to person [Place] : oriented to place [Time] : oriented to time [Short Term Intact] : short term memory intact [Span Intact] : the attention span was normal [Remote Intact] : remote memory intact [Registration Intact] : recent registration memory intact [Concentration Intact] : normal concentrating ability [Visual Intact] : visual attention was ~T not ~L decreased [Fluency] : fluency intact [Comprehension] : comprehension intact [Past History] : adequate knowledge of personal past history [Current Events] : adequate knowledge of current events [Vocabulary] : adequate range of vocabulary [Cranial Nerves Optic (II)] : visual acuity intact bilaterally,  pupils equal round and reactive to light [I: Normal Smell] : smell intact bilaterally [Cranial Nerves Oculomotor (III)] : extraocular motion intact [Cranial Nerves Facial (VII)] : face symmetrical [Cranial Nerves Trigeminal (V)] : facial sensation intact symmetrically [Cranial Nerves Vestibulocochlear (VIII)] : hearing was intact bilaterally [Cranial Nerves Glossopharyngeal (IX)] : tongue and palate midline [Cranial Nerves Accessory (XI - Cranial And Spinal)] : head turning and shoulder shrug symmetric [Motor Strength] : muscle strength was normal in all four extremities [Cranial Nerves Hypoglossal (XII)] : there was no tongue deviation with protrusion [Motor Tone] : muscle tone was normal in all four extremities [5] : S1 toe walking 5/5 [Sensation Tactile Decrease] : light touch was intact [Sensation Pain / Temperature Decrease] : pain and temperature was intact [Balance] : balance was intact [2+] : Ankle jerk left 2+ [Normal] : normal [Able to toe walk] : the patient was able to toe walk [No Visual Abnormalities] : no visible abnormailities [Sclera] : the sclera and conjunctiva were normal [Able to heel walk] : the patient was able to heel walk [PERRL With Normal Accommodation] : pupils were equal in size, round, reactive to light, with normal accommodation [Hearing Threshold Finger Rub Not [place]] : hearing was normal [Outer Ear] : the ears and nose were normal in appearance [Extraocular Movements] : extraocular movements were intact [Examination Of The Oral Cavity] : the lips and gums were normal [Respiration, Rhythm And Depth] : normal respiratory rhythm and effort [Neck Appearance] : the appearance of the neck was normal [Bowel Sounds] : normal bowel sounds [Abdomen Tenderness] : non-tender [No Spinal Tenderness] : no spinal tenderness [Skin Color & Pigmentation] : normal skin color and pigmentation [Abnormal Walk] : normal gait [Skin Turgor] : normal skin turgor [] : no rash [Edema] : there was no peripheral edema [No CVA Tenderness] : no ~M costovertebral angle tenderness [Erythema] : not erythematous [Warm] : not warm [Tender] : not tender [Indurated] : not indurated [Fluctuant] : not fluctuant [FreeTextEntry1] : right frontal head [Romberg's Sign] : Romberg's sign was negtive

## 2019-07-05 ENCOUNTER — EMERGENCY (EMERGENCY)
Facility: HOSPITAL | Age: 64
LOS: 1 days | Discharge: HOME | End: 2019-07-05
Attending: EMERGENCY MEDICINE | Admitting: EMERGENCY MEDICINE
Payer: MEDICAID

## 2019-07-05 VITALS
SYSTOLIC BLOOD PRESSURE: 153 MMHG | HEART RATE: 85 BPM | DIASTOLIC BLOOD PRESSURE: 96 MMHG | RESPIRATION RATE: 17 BRPM | TEMPERATURE: 98 F | OXYGEN SATURATION: 100 %

## 2019-07-05 PROCEDURE — 99284 EMERGENCY DEPT VISIT MOD MDM: CPT | Mod: 25

## 2019-07-05 PROCEDURE — 93010 ELECTROCARDIOGRAM REPORT: CPT

## 2019-07-05 NOTE — ED ADULT TRIAGE NOTE - CHIEF COMPLAINT QUOTE
Pt c/o fevers, cough, and chest pain starting about 10 dyas ago. Pt seen by PCP and prescribed abx and Motrin with out relief. PMH  shunt

## 2019-07-06 VITALS
OXYGEN SATURATION: 100 % | HEART RATE: 74 BPM | DIASTOLIC BLOOD PRESSURE: 86 MMHG | TEMPERATURE: 98 F | RESPIRATION RATE: 20 BRPM | SYSTOLIC BLOOD PRESSURE: 141 MMHG

## 2019-07-06 DIAGNOSIS — Z98.2 PRESENCE OF CEREBROSPINAL FLUID DRAINAGE DEVICE: Chronic | ICD-10-CM

## 2019-07-06 LAB
ALBUMIN SERPL ELPH-MCNC: 4.2 G/DL — SIGNIFICANT CHANGE UP (ref 3.3–5)
ALP SERPL-CCNC: 106 U/L — SIGNIFICANT CHANGE UP (ref 40–120)
ALT FLD-CCNC: 28 U/L — SIGNIFICANT CHANGE UP (ref 4–33)
ANION GAP SERPL CALC-SCNC: 14 MMO/L — SIGNIFICANT CHANGE UP (ref 7–14)
APTT BLD: 35.6 SEC — SIGNIFICANT CHANGE UP (ref 27.5–36.3)
AST SERPL-CCNC: 24 U/L — SIGNIFICANT CHANGE UP (ref 4–32)
BASOPHILS # BLD AUTO: 0.03 K/UL — SIGNIFICANT CHANGE UP (ref 0–0.2)
BASOPHILS NFR BLD AUTO: 0.3 % — SIGNIFICANT CHANGE UP (ref 0–2)
BILIRUB SERPL-MCNC: 0.2 MG/DL — SIGNIFICANT CHANGE UP (ref 0.2–1.2)
BUN SERPL-MCNC: 9 MG/DL — SIGNIFICANT CHANGE UP (ref 7–23)
CALCIUM SERPL-MCNC: 9.9 MG/DL — SIGNIFICANT CHANGE UP (ref 8.4–10.5)
CHLORIDE SERPL-SCNC: 99 MMOL/L — SIGNIFICANT CHANGE UP (ref 98–107)
CO2 SERPL-SCNC: 23 MMOL/L — SIGNIFICANT CHANGE UP (ref 22–31)
CREAT SERPL-MCNC: 0.6 MG/DL — SIGNIFICANT CHANGE UP (ref 0.5–1.3)
D DIMER BLD IA.RAPID-MCNC: 554 NG/ML — SIGNIFICANT CHANGE UP
EOSINOPHIL # BLD AUTO: 0.15 K/UL — SIGNIFICANT CHANGE UP (ref 0–0.5)
EOSINOPHIL NFR BLD AUTO: 1.4 % — SIGNIFICANT CHANGE UP (ref 0–6)
GLUCOSE SERPL-MCNC: 111 MG/DL — HIGH (ref 70–99)
HCT VFR BLD CALC: 38.5 % — SIGNIFICANT CHANGE UP (ref 34.5–45)
HGB BLD-MCNC: 12.8 G/DL — SIGNIFICANT CHANGE UP (ref 11.5–15.5)
IMM GRANULOCYTES NFR BLD AUTO: 0.6 % — SIGNIFICANT CHANGE UP (ref 0–1.5)
INR BLD: 1.03 — SIGNIFICANT CHANGE UP (ref 0.88–1.17)
LYMPHOCYTES # BLD AUTO: 3.58 K/UL — HIGH (ref 1–3.3)
LYMPHOCYTES # BLD AUTO: 32.8 % — SIGNIFICANT CHANGE UP (ref 13–44)
MCHC RBC-ENTMCNC: 28.2 PG — SIGNIFICANT CHANGE UP (ref 27–34)
MCHC RBC-ENTMCNC: 33.2 % — SIGNIFICANT CHANGE UP (ref 32–36)
MCV RBC AUTO: 84.8 FL — SIGNIFICANT CHANGE UP (ref 80–100)
MONOCYTES # BLD AUTO: 0.86 K/UL — SIGNIFICANT CHANGE UP (ref 0–0.9)
MONOCYTES NFR BLD AUTO: 7.9 % — SIGNIFICANT CHANGE UP (ref 2–14)
NEUTROPHILS # BLD AUTO: 6.23 K/UL — SIGNIFICANT CHANGE UP (ref 1.8–7.4)
NEUTROPHILS NFR BLD AUTO: 57 % — SIGNIFICANT CHANGE UP (ref 43–77)
NRBC # FLD: 0 K/UL — SIGNIFICANT CHANGE UP (ref 0–0)
PLATELET # BLD AUTO: 242 K/UL — SIGNIFICANT CHANGE UP (ref 150–400)
PMV BLD: 12 FL — SIGNIFICANT CHANGE UP (ref 7–13)
POTASSIUM SERPL-MCNC: 4.2 MMOL/L — SIGNIFICANT CHANGE UP (ref 3.5–5.3)
POTASSIUM SERPL-SCNC: 4.2 MMOL/L — SIGNIFICANT CHANGE UP (ref 3.5–5.3)
PROT SERPL-MCNC: 8.3 G/DL — SIGNIFICANT CHANGE UP (ref 6–8.3)
PROTHROM AB SERPL-ACNC: 11.7 SEC — SIGNIFICANT CHANGE UP (ref 9.8–13.1)
RBC # BLD: 4.54 M/UL — SIGNIFICANT CHANGE UP (ref 3.8–5.2)
RBC # FLD: 11.9 % — SIGNIFICANT CHANGE UP (ref 10.3–14.5)
SODIUM SERPL-SCNC: 136 MMOL/L — SIGNIFICANT CHANGE UP (ref 135–145)
TROPONIN T, HIGH SENSITIVITY: < 6 NG/L — SIGNIFICANT CHANGE UP (ref ?–14)
WBC # BLD: 10.92 K/UL — HIGH (ref 3.8–10.5)
WBC # FLD AUTO: 10.92 K/UL — HIGH (ref 3.8–10.5)

## 2019-07-06 PROCEDURE — 71275 CT ANGIOGRAPHY CHEST: CPT | Mod: 26

## 2019-07-06 NOTE — ED ADULT NURSE NOTE - NSIMPLEMENTINTERV_GEN_ALL_ED
Implemented All Universal Safety Interventions:  [place] to call system. Call bell, personal items and telephone within reach. Instruct patient to call for assistance. Room bathroom lighting operational. Non-slip footwear when patient is off stretcher. Physically safe environment: no spills, clutter or unnecessary equipment. Stretcher in lowest position, wheels locked, appropriate side rails in place.

## 2019-07-06 NOTE — ED PROVIDER NOTE - ATTENDING CONTRIBUTION TO CARE
64 year old with cough and cp, recently returned from [place]. concern for pna vs ptx vs pe vs electrolyte abn vs rad vs interstitial lung process.  will check labs, ct for pe vs pna vs ptx. will monitor on tele.

## 2019-07-06 NOTE — ED PROVIDER NOTE - CARE PROVIDER_API CALL
Thad Barclay)  Family Medicine  [address] [place] [address]  Phone: [phone]  Fax: [phone]  Follow Up Time:

## 2019-07-06 NOTE — ED PROVIDER NOTE - PHYSICAL EXAMINATION
GENERAL: No acute distress, well-developed  HEAD:  Atraumatic, Normocephalic  ENT: PERRL, conjunctiva and sclera clear, neck supple, no JVD, moist mucosa, posterior oropharynx clear  CHEST/LUNG: Clear to auscultation bilaterally; No wheeze, equal breath sounds bilaterally, respirations nonlabored, on RA. R chest wall tenderness to palpation, no skin changes  HEART: Regular rate and rhythm; No murmurs, rubs, or gallops  ABDOMEN: Soft, nontender, nondistended; Bowel sounds present, no organomegaly  BACK: no spinal tenderness, no CVA tenderness  EXTREMITIES:  No clubbing, cyanosis, or edema, pedal pulses palpable  PSYCH: Nl behavior, nl affect  NEUROLOGY: AAOx3, non-focal, moves all extremities spontaneously  SKIN: Normal color, No rashes or lesions

## 2019-07-06 NOTE — ED PROVIDER NOTE - OBJECTIVE STATEMENT
63yo F Yolanda-speaking with hx hydrocephalus s/p  shunt (2/2018), glaucoma presents with R chest pain. Allan Molina at bedside translated. Pt returned from a trip to [place] 2 weeks ago where she had diarrhea and dehydration. 1 week ago she developed productive cough with yellow sputum, SOB, fever, sore throat, HA. She saw her PMD who prescribed ibuprofen and 5-day Z-pack which improved her symptoms. This morning she developed sharp R-sided CP that is worse with palpation and deep inspiration, mildly exacerbated by exertion. Denies current SOB. Denies hemoptysis, abd pain, urinary symptoms, change in BM, palpitations, LOC. Denies LE edema, recent surgery, hormone replacement.    PMD Dr. Thad Barclay ([facility])  Neurosx Dr. Johanny Ramos

## 2019-07-06 NOTE — ED PROVIDER NOTE - CLINICAL SUMMARY MEDICAL DECISION MAKING FREE TEXT BOX
Recent international flight with R-sided CP after recently being treated for pneumonia with azithromycin outpt. EKG without acute ischemic change. Low suspicion PE. Check D-dimer, CXR, CBC, CMP, trop.

## 2019-07-06 NOTE — ED PROVIDER NOTE - NSFOLLOWUPINSTRUCTIONS_ED_ALL_ED_FT
Your CT scan of the chest showed pneumonia. You do not have a blood clot in your lungs. Finish taking levaquin antibiotic for your pneumonia. Followup with your primary doctor within a week of discharge.

## 2019-10-31 NOTE — ED PROVIDER NOTE - ENMT NEGATIVE STATEMENT, MLM
CC: COPD, hypertension, hyperlipidemia, sleep apnea    HPI:   Wally presents today discuss the following medical issues:    Chronic obstructive pulmonary disease, unspecified COPD type (HCC)  Patient has been asymptomatic.  Currently he denies any cough or shortness of breath.  He could not afford the inhalers(Advair, Symbicort, Spiriva, albuterol).  He has been having frequent COPD exacerbations because he is not on medications.  Has been using all the Medrol Dosepak and sometimes oral antibiotics for his exacerbation and has been helping Patient has high cardiac risk, 12.4%, last lipid panel showed:    Essential hypertension  Blood pressure has been adequately controlled.  Denies headache, chest pain, shortness of breath.  He has been on lisinopril-hydrochlorothiazide 20 mg- 12.5 mg he takes full tablet in the morning and half a tablet in the evening , and amlodipine 10 mg daily.     Mixed hyperlipidemia  Last lipid panel showed:   Ref Range & Units 1mo ago   Cholesterol,Tot 100 - 199 mg/dL 215High     Triglycerides 0 - 149 mg/dL 139    HDL >=40 mg/dL 54    LDL <100 mg/dL 133High      No history of diabetes, CAD, or stroke.  Patient has hypertension years cardiac risk, 12.4%.      SHAAN (obstructive sleep apnea)  Patient was diagnosed with sleep apnea in the past, could not afford CPAP.      Patient Active Problem List    Diagnosis Date Noted   • Hypertension 01/18/2010     Priority: High   • SHAAN (obstructive sleep apnea) 05/14/2019   • Restrictive lung disease 04/05/2019   • SOB (shortness of breath) 04/05/2019   • Chronic obstructive pulmonary disease (HCC) 02/28/2019   • Mixed hyperlipidemia 01/18/2018   • Obesity (BMI 30-39.9) 07/09/2014   • Former heavy cigarette smoker (20-39 per day) 07/09/2014   • Family history of heart attack 07/09/2014   • Cardiac murmur 07/09/2014       Current Outpatient Medications   Medication Sig Dispense Refill   • methylPREDNISolone (MEDROL DOSEPAK) 4 MG Tablet Therapy Pack As  "directed on the packaging label. 21 Tab 0   • methylPREDNISolone (MEDROL DOSEPAK) 4 MG Tablet Therapy Pack As directed on the packaging label. 21 Tab 0   • amLODIPine (NORVASC) 10 MG Tab TAKE 1 TABLET BY MOUTH  EVERY DAY 90 Tab 3   • amLODIPine (NORVASC) 10 MG Tab Take 1 Tab by mouth every day. 90 Tab 3   • albuterol 108 (90 Base) MCG/ACT Aero Soln inhalation aerosol Inhale 2 Puffs by mouth every 6 hours as needed for Shortness of Breath. (Patient not taking: Reported on 8/2/2019) 8.5 g 3   • fluticasone (FLOVENT HFA) 110 MCG/ACT Aerosol Inhale 2 Puffs by mouth 2 times a day. (Patient not taking: Reported on 5/2/2019) 1 Inhaler 3   • fluticasone (FLOVENT HFA) 110 MCG/ACT Aerosol Inhale 2 Puffs by mouth 2 times a day. (Patient not taking: Reported on 5/2/2019) 1 Inhaler 3   • MethylPREDNISolone (MEDROL DOSEPAK) 4 MG Tablet Therapy Pack As directed on the packaging label. (Patient not taking: Reported on 11/13/2018) 21 Tab 0   • lisinopril-hydrochlorothiazide (PRINZIDE, ZESTORETIC) 20-12.5 MG per tablet Take 1 Tab by mouth every day. 90 Tab 3     No current facility-administered medications for this visit.          Allergies as of 10/31/2019 - Reviewed 10/31/2019   Allergen Reaction Noted   • Nkda [no known drug allergy]  01/18/2010        ROS: Denies any chest pain, Shortness of breath, Changes bowel or bladder, Lower extremity edema.    Physical Exam:  /80 (BP Location: Right arm, Patient Position: Sitting, BP Cuff Size: Adult)   Pulse 76   Temp 36.8 °C (98.3 °F) (Temporal)   Resp 16   Ht 1.778 m (5' 10\")   Wt 119.3 kg (263 lb)   SpO2 93%   BMI 37.74 kg/m²   Gen.: Well-developed, well-nourished, no apparent distress,pleasant and cooperative with the examination  Skin:  Warm and dry with good turgor. No rashes or suspicious lesions in visible areas  HEENT:Sinuses nontender with palpation, TMs clear, nares patent with pink mucosa and clear rhinorrhea,no septal deviation ,polyps or lesions. lips without " lesions, oropharynx clear.  Neck: Trachea midline,no masses or adenopathy. No JVD.  Cor: Regular rate and rhythm without murmur, gallop or rub.  Lungs: Respirations unlabored.Clear to auscultation with equal breath sounds bilaterally. No wheezes, rhonchi.  Extremities: No cyanosis, clubbing or edema.      Assessment and Plan.   64 y.o. male     1. Chronic obstructive pulmonary disease, unspecified COPD type (HCC)  Stable.  Patient cannot afford the inhalers(Advair, Symbicort, Spiriva, albuterol).  Patient has frequent COPD exacerbations because he is not on medications.  So will send him to a pulmonologist for follow-up and for samples.  Advised to keep the Medrol Dosepak available as needed for exacerbations.  He stated that it has been helping him a lot.    - REFERRAL TO PULMONOLOGY  - methylPREDNISolone (MEDROL DOSEPAK) 4 MG Tablet Therapy Pack; As directed on the packaging label.  Dispense: 21 Tab; Refill: 0    2. Essential hypertension  Adequately controlled.  Continue on amlodipine 10 mg daily, and lisinopril-hydrochlorothiazide 20-12.5 mg daily.    3. Mixed hyperlipidemia  Patient has high cardiac risk, 12.4%, last lipid panel showed:     Ref Range & Units 1mo ago   Cholesterol,Tot 100 - 199 mg/dL 215High     Triglycerides 0 - 149 mg/dL 139    HDL >=40 mg/dL 54    LDL <100 mg/dL 133High      Patient is counseled on lifestyle modification.    4. SHAAN (obstructive sleep apnea)  Patient was diagnosed with sleep apnea in the past, could not afford CPAP.       Ears: no ear pain and no hearing problems.Nose: no nasal congestion and no nasal drainage.Mouth/Throat: no dysphagia, no hoarseness and no throat pain.Neck: no lumps, no pain, no stiffness and no swollen glands.

## 2019-11-26 PROBLEM — H40.9 UNSPECIFIED GLAUCOMA: Chronic | Status: ACTIVE | Noted: 2019-07-06

## 2019-11-26 PROBLEM — G91.9 HYDROCEPHALUS, UNSPECIFIED: Chronic | Status: ACTIVE | Noted: 2019-07-06

## 2019-12-03 ENCOUNTER — APPOINTMENT (OUTPATIENT)
Dept: CT IMAGING | Facility: CLINIC | Age: 64
End: 2019-12-03
Payer: MEDICAID

## 2019-12-03 ENCOUNTER — OUTPATIENT (OUTPATIENT)
Dept: OUTPATIENT SERVICES | Facility: HOSPITAL | Age: 64
LOS: 1 days | End: 2019-12-03
Payer: MEDICAID

## 2019-12-03 DIAGNOSIS — Z98.2 PRESENCE OF CEREBROSPINAL FLUID DRAINAGE DEVICE: ICD-10-CM

## 2019-12-03 DIAGNOSIS — Z98.2 PRESENCE OF CEREBROSPINAL FLUID DRAINAGE DEVICE: Chronic | ICD-10-CM

## 2019-12-03 DIAGNOSIS — G91.9 HYDROCEPHALUS, UNSPECIFIED: ICD-10-CM

## 2019-12-03 PROCEDURE — 70450 CT HEAD/BRAIN W/O DYE: CPT | Mod: 26

## 2019-12-03 PROCEDURE — 70450 CT HEAD/BRAIN W/O DYE: CPT

## 2019-12-10 ENCOUNTER — APPOINTMENT (OUTPATIENT)
Dept: NEUROSURGERY | Facility: CLINIC | Age: 64
End: 2019-12-10

## 2020-03-04 ENCOUNTER — APPOINTMENT (OUTPATIENT)
Dept: NEUROSURGERY | Facility: CLINIC | Age: 65
End: 2020-03-04
Payer: MEDICAID

## 2020-03-04 VITALS
WEIGHT: 135 LBS | SYSTOLIC BLOOD PRESSURE: 139 MMHG | HEART RATE: 92 BPM | HEIGHT: 64 IN | DIASTOLIC BLOOD PRESSURE: 91 MMHG | BODY MASS INDEX: 23.05 KG/M2

## 2020-03-04 PROCEDURE — 99213 OFFICE O/P EST LOW 20 MIN: CPT

## 2020-03-10 NOTE — HISTORY OF PRESENT ILLNESS
[FreeTextEntry1] : Ms. SHAWNA CASTRO is a 65 yo F who initially presented to the [facility] ED in Feb 2018 for sudden onset of difficulty walking, urinary frequency without dysuria, and worsening forgetfulness. Initial CT head revealed hydrocephalus and she underwent right side VPS (strata set to 2.0) placement on 2/23/2018. Today she returns after a recent trip to [place]. She had mild tightness in her legs but denies gait disturbance, dizziness, headache, visual changes, urinary dysfunction. Surgical incision appears well healed and shunt pumps and refills briskly with setting checked @ 2.0 today.

## 2020-03-10 NOTE — ASSESSMENT
[FreeTextEntry1] : 64 yo F with h/o VPS for obstructive hydrocephalus. She returns after extensive travel for routine follow up exam. Doing well. \par \par - CT head

## 2020-03-10 NOTE — PHYSICAL EXAM
[General Appearance - Alert] : alert [General Appearance - In No Acute Distress] : in no acute distress [General Appearance - Well Nourished] : well nourished [General Appearance - Well Developed] : well developed [General Appearance - Well-Appearing] : healthy appearing [Longitudinal] : longitudinal [Clean] : clean [Dry] : dry [Well-Healed] : well-healed [No Drainage] : without drainage [Normal Skin] : normal [Oriented To Time, Place, And Person] : oriented to person, place, and time [Impaired Insight] : insight and judgment were intact [Affect] : the affect was normal [Person] : oriented to person [Place] : oriented to place [Time] : oriented to time [Short Term Intact] : short term memory intact [Remote Intact] : remote memory intact [Registration Intact] : recent registration memory intact [Span Intact] : the attention span was normal [Concentration Intact] : normal concentrating ability [Visual Intact] : visual attention was ~T not ~L decreased [Fluency] : fluency intact [Comprehension] : comprehension intact [Current Events] : adequate knowledge of current events [Past History] : adequate knowledge of personal past history [Vocabulary] : adequate range of vocabulary [I: Normal Smell] : smell intact bilaterally [Cranial Nerves Optic (II)] : visual acuity intact bilaterally,  pupils equal round and reactive to light [Cranial Nerves Oculomotor (III)] : extraocular motion intact [Cranial Nerves Trigeminal (V)] : facial sensation intact symmetrically [Cranial Nerves Facial (VII)] : face symmetrical [Cranial Nerves Vestibulocochlear (VIII)] : hearing was intact bilaterally [Cranial Nerves Glossopharyngeal (IX)] : tongue and palate midline [Cranial Nerves Accessory (XI - Cranial And Spinal)] : head turning and shoulder shrug symmetric [Cranial Nerves Hypoglossal (XII)] : there was no tongue deviation with protrusion [Motor Tone] : muscle tone was normal in all four extremities [Motor Strength] : muscle strength was normal in all four extremities [5] : S1 toe walking 5/5 [Sensation Tactile Decrease] : light touch was intact [Sensation Pain / Temperature Decrease] : pain and temperature was intact [Balance] : balance was intact [2+] : Ankle jerk left 2+ [No Visual Abnormalities] : no visible abnormailities [Normal] : normal [Able to toe walk] : the patient was able to toe walk [Able to heel walk] : the patient was able to heel walk [Sclera] : the sclera and conjunctiva were normal [PERRL With Normal Accommodation] : pupils were equal in size, round, reactive to light, with normal accommodation [Extraocular Movements] : extraocular movements were intact [Outer Ear] : the ears and nose were normal in appearance [Hearing Threshold Finger Rub Not [place]] : hearing was normal [Examination Of The Oral Cavity] : the lips and gums were normal [Neck Appearance] : the appearance of the neck was normal [Respiration, Rhythm And Depth] : normal respiratory rhythm and effort [Edema] : there was no peripheral edema [Bowel Sounds] : normal bowel sounds [Abdomen Tenderness] : non-tender [No CVA Tenderness] : no ~M costovertebral angle tenderness [No Spinal Tenderness] : no spinal tenderness [Abnormal Walk] : normal gait [Skin Color & Pigmentation] : normal skin color and pigmentation [Skin Turgor] : normal skin turgor [] : no rash [Erythema] : not erythematous [Tender] : not tender [Warm] : not warm [Indurated] : not indurated [Fluctuant] : not fluctuant [FreeTextEntry1] : right frontal head [Romberg's Sign] : Romberg's sign was negtive

## 2021-04-05 ENCOUNTER — APPOINTMENT (OUTPATIENT)
Dept: NEUROSURGERY | Facility: CLINIC | Age: 66
End: 2021-04-05
Payer: MEDICAID

## 2021-04-07 ENCOUNTER — APPOINTMENT (OUTPATIENT)
Dept: NEUROSURGERY | Facility: CLINIC | Age: 66
End: 2021-04-07
Payer: MEDICAID

## 2021-04-07 VITALS
WEIGHT: 130 LBS | DIASTOLIC BLOOD PRESSURE: 87 MMHG | HEIGHT: 64 IN | HEART RATE: 98 BPM | SYSTOLIC BLOOD PRESSURE: 139 MMHG | BODY MASS INDEX: 22.2 KG/M2

## 2021-04-07 VITALS — TEMPERATURE: 97.2 F

## 2021-04-07 PROCEDURE — 99072 ADDL SUPL MATRL&STAF TM PHE: CPT

## 2021-04-07 PROCEDURE — 99212 OFFICE O/P EST SF 10 MIN: CPT

## 2021-04-07 NOTE — ASSESSMENT
[FreeTextEntry1] : 66 year old female with history obstructive hydrocephalus s/p right sided  shunt  (strata set to 2.0) placement on 2/23/2018. Patient is here for one year follow up, neurologically doing great, no motor or sensory complaints, denies headache, vision changes, urinary frequency or incontinence. Patient's last CT was in 12/3/19. Will get a repeat CT since it is been 2 years, if stable patient can follow up with us as needed. \par \par \par \par Plan:\par - CT head w/o\par - RTC after CT \par \par \par

## 2021-04-07 NOTE — HISTORY OF PRESENT ILLNESS
[FreeTextEntry1] : 67 yo F who initially presented to the [facility] ED in Feb 2018 for sudden onset of difficulty walking, urinary frequency without dysuria, and worsening forgetfulness. Initial CT head revealed hydrocephalus and she underwent right side VPS (strata set to 2.0) placement on 2/23/2018. Last follow up visit was on 3/4/20, she had mild tightness in her legs but denied gait disturbance, dizziness, headache, visual changes, urinary dysfunction. Here for one year follow up. Denies any motor or sensory changes, no balance issues.

## 2021-04-07 NOTE — PHYSICAL EXAM
[General Appearance - Alert] : alert [General Appearance - In No Acute Distress] : in no acute distress [Person] : oriented to person [Place] : oriented to place [Time] : oriented to time [Cranial Nerves Optic (II)] : visual acuity intact bilaterally,  pupils equal round and reactive to light [Cranial Nerves Oculomotor (III)] : extraocular motion intact [Cranial Nerves Trigeminal (V)] : facial sensation intact symmetrically [Cranial Nerves Facial (VII)] : face symmetrical [Cranial Nerves Vestibulocochlear (VIII)] : hearing was intact bilaterally [Cranial Nerves Accessory (XI - Cranial And Spinal)] : head turning and shoulder shrug symmetric [Cranial Nerves Hypoglossal (XII)] : there was no tongue deviation with protrusion [Sensation Tactile Decrease] : light touch was intact [Abnormal Walk] : normal gait [Balance] : balance was intact

## 2021-04-12 ENCOUNTER — OUTPATIENT (OUTPATIENT)
Dept: OUTPATIENT SERVICES | Facility: HOSPITAL | Age: 66
LOS: 1 days | End: 2021-04-12
Payer: MEDICAID

## 2021-04-12 ENCOUNTER — APPOINTMENT (OUTPATIENT)
Dept: CT IMAGING | Facility: CLINIC | Age: 66
End: 2021-04-12
Payer: MEDICAID

## 2021-04-12 DIAGNOSIS — Z98.2 PRESENCE OF CEREBROSPINAL FLUID DRAINAGE DEVICE: Chronic | ICD-10-CM

## 2021-04-12 DIAGNOSIS — Z98.2 PRESENCE OF CEREBROSPINAL FLUID DRAINAGE DEVICE: ICD-10-CM

## 2021-04-12 PROCEDURE — 70450 CT HEAD/BRAIN W/O DYE: CPT

## 2021-04-12 PROCEDURE — 70450 CT HEAD/BRAIN W/O DYE: CPT | Mod: 26

## 2021-09-23 ENCOUNTER — OUTPATIENT (OUTPATIENT)
Dept: OUTPATIENT SERVICES | Facility: HOSPITAL | Age: 66
LOS: 1 days | End: 2021-09-23
Payer: MEDICAID

## 2021-09-23 ENCOUNTER — APPOINTMENT (OUTPATIENT)
Dept: MAMMOGRAPHY | Facility: IMAGING CENTER | Age: 66
End: 2021-09-23
Payer: MEDICAID

## 2021-09-23 DIAGNOSIS — Z00.8 ENCOUNTER FOR OTHER GENERAL EXAMINATION: ICD-10-CM

## 2021-09-23 DIAGNOSIS — Z98.2 PRESENCE OF CEREBROSPINAL FLUID DRAINAGE DEVICE: Chronic | ICD-10-CM

## 2021-09-23 PROCEDURE — 77063 BREAST TOMOSYNTHESIS BI: CPT | Mod: 26

## 2021-09-23 PROCEDURE — 77067 SCR MAMMO BI INCL CAD: CPT

## 2021-09-23 PROCEDURE — 77063 BREAST TOMOSYNTHESIS BI: CPT

## 2021-09-23 PROCEDURE — 77067 SCR MAMMO BI INCL CAD: CPT | Mod: 26
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
## 2022-07-25 NOTE — H&P ADULT - DOES THIS PATIENT HAVE A HISTORY OF OR HAS BEEN DX WITH HEART FAILURE?
[FreeTextEntry1] : NAD.  AOx3.  Intact memory.  Speech fluent, nondysarthric.  CN 2 – 12 w/ L blephaspasm and hemifacial spasm w/ activity in zygomaticus, nasalis, levator labii superioris, orbicularis oculi.  \par Strength 5/5 b/l UE/LE.  NL tone, bulk.  No abnl movements.  DTRs 2+ throughout.  Plantar response downgoing b/l.  (-) Hoffmans, clonus.  Sensory intact LT/PP, pain, temp, proprioception and vibration.  NL FTN/HKS.  No dysdiadokinesia.  Gait narrow based/NL tandem.\par  no

## 2022-08-26 ENCOUNTER — APPOINTMENT (OUTPATIENT)
Dept: RADIOLOGY | Facility: CLINIC | Age: 67
End: 2022-08-26

## 2022-08-26 ENCOUNTER — APPOINTMENT (OUTPATIENT)
Dept: NEUROSURGERY | Facility: CLINIC | Age: 67
End: 2022-08-26

## 2022-08-26 ENCOUNTER — OUTPATIENT (OUTPATIENT)
Dept: OUTPATIENT SERVICES | Facility: HOSPITAL | Age: 67
LOS: 1 days | End: 2022-08-26
Payer: MEDICAID

## 2022-08-26 DIAGNOSIS — Z00.8 ENCOUNTER FOR OTHER GENERAL EXAMINATION: ICD-10-CM

## 2022-08-26 DIAGNOSIS — Z98.2 PRESENCE OF CEREBROSPINAL FLUID DRAINAGE DEVICE: Chronic | ICD-10-CM

## 2022-08-26 DIAGNOSIS — Z98.2 PRESENCE OF CEREBROSPINAL FLUID DRAINAGE DEVICE: ICD-10-CM

## 2022-08-26 DIAGNOSIS — G91.9 HYDROCEPHALUS, UNSPECIFIED: ICD-10-CM

## 2022-08-26 PROCEDURE — 99212 OFFICE O/P EST SF 10 MIN: CPT

## 2022-08-26 PROCEDURE — 71046 X-RAY EXAM CHEST 2 VIEWS: CPT | Mod: 26

## 2022-08-26 PROCEDURE — 71046 X-RAY EXAM CHEST 2 VIEWS: CPT

## 2022-08-29 NOTE — ASSESSMENT
[FreeTextEntry1] : Impression:\par 67 year old female with history obstructive hydrocephalus s/p right sided  shunt (strata set to 2.0) placement on 2/23/2018\par Feeling well without complaints\par \par Nurse Practitioner (Peyton Koo) checked and confirmed setting at 2.0.\par \par Plan:\par Follow Up with Neurology\par Follow Up with Me As Needed

## 2022-08-29 NOTE — PHYSICAL EXAM
[General Appearance - Alert] : alert [General Appearance - In No Acute Distress] : in no acute distress [General Appearance - Well-Appearing] : healthy appearing [Oriented To Time, Place, And Person] : oriented to person, place, and time [Person] : oriented to person [Place] : oriented to place [Time] : oriented to time [Motor Tone] : muscle tone was normal in all four extremities [Intact] : all motor groups within normal limits of strength and tone bilaterally [Sclera] : the sclera and conjunctiva were normal [Outer Ear] : the ears and nose were normal in appearance [Neck Appearance] : the appearance of the neck was normal [] : no respiratory distress [Heart Rate And Rhythm] : heart rate was normal and rhythm regular [Abnormal Walk] : normal gait [Skin Color & Pigmentation] : normal skin color and pigmentation

## 2022-08-29 NOTE — HISTORY OF PRESENT ILLNESS
[de-identified] : 68 yo F who initially presented to the [facility] ED in Feb 2018 for sudden onset of difficulty walking, urinary frequency without dysuria, and worsening forgetfulness. Initial CT head revealed hydrocephalus and she underwent right side VPS (strata set to 2.0) placement on 2/23/2018 with Dr. Ramos. Last follow up visit was on 4/7/2021, she had mild tightness in her legs but denied gait disturbance, dizziness, headache, visual changes, urinary dysfunction.  Denies any motor or sensory changes, no balance issues.

## 2023-05-17 NOTE — PATIENT PROFILE ADULT. - FUNCTIONAL SCREEN CURRENT LEVEL: TRANSFERRING, MLM
Ambulatory reporting HA and "shaking" that started approx 1 hour ago while at [facility]. Daughter gave 2 ibuprofen approx 30 minutes ago, PMH of HTN and tachycardia compliant with metoprolol and amlodipine. Denies vision changes, neuro intact. (0) independent

## 2024-09-20 NOTE — DISCHARGE NOTE ADULT - PAIN PRESENT
Controlled. Anemia is likely due to acute blood loss which was from surgery. Most recent hemoglobin and hematocrit are listed below.  Recent Labs     09/18/24  0320 09/19/24  0256 09/20/24  0434   HGB 8.4* 7.7* 7.8*   HCT 25.3* 23.5* 24.2*     Plan  - Monitor serial CBC: Daily  - Transfuse PRBC if patient becomes hemodynamically unstable, symptomatic or H/H drops below 7/21.  - Patient has not received any PRBC transfusions to date  - Patient's anemia is currently stable. No indication for blood transfusion.    No

## 2025-04-18 NOTE — DISCHARGE NOTE ADULT - PRINCIPAL DIAGNOSIS
Wound Care Provider Visit     Patient seen in Wound Care Clinic by: GAAYTRI Lagunas NP. RN assisted by: OJ Fritz and OJ Romero    Wound care therapy plan changes: Surgical Mesh came off with removal of vac sponge, Surgery notified and saw pt and did debridement. Continue to vac at 125, pack tunnel. Discontinue using Endoform.     DME requested: none  Lab(s)/imaging orders placed this visit: None  Wound(s) debrided by provider: No Consent obtained/verified: N/A  Dermal Applied: No Next Dermal Placement on: N/A    Wound care being completed by: Wound Clinic 3x/week  Supplies provided/ordered: Provided to patient    Follow up in 2 weeks.      Problem: Skin Integrity Alteration  Goal: Skin remains intact with no new/deterioration of wound or pressure injury  Outcome: Monitoring/Evaluating progress  Goal: Participates in wound care activities  Outcome: Monitoring/Evaluating progress      Hydrocephalus

## 2025-05-08 NOTE — DISCHARGE NOTE ADULT - HAS THE PATIENT RECEIVED THE INFLUENZA VACCINE DURING THIS VISIT
Does she want me to send rx to [facility] pharmacy for zepbound? It is not clear to me what she is asking me to do regarding prescription for GLP-1. Please call her to clarify what she is asking of me.      Regarding the blood pressure and symptoms, I do not believe her elevated blood pressure is from her GLP-1. It is possibly but unlikely. It is likely it is due to lack of sleep, stress, caffeine/alcohol, etc.     Yes